# Patient Record
Sex: FEMALE | Employment: FULL TIME | URBAN - METROPOLITAN AREA
[De-identification: names, ages, dates, MRNs, and addresses within clinical notes are randomized per-mention and may not be internally consistent; named-entity substitution may affect disease eponyms.]

---

## 2017-01-03 ENCOUNTER — PRE VISIT (OUTPATIENT)
Dept: OTOLARYNGOLOGY | Facility: CLINIC | Age: 27
End: 2017-01-03

## 2017-01-03 NOTE — TELEPHONE ENCOUNTER
1.  Date/reason for appt: 1/19/17 - tonsillar enlargement  2.  Referring provider: Dr. Vinay Ramesh  3.  Call to patient (Yes / No - short description): no, recs in epic  4.  Previous care at:   - [facility] [place]

## 2017-01-19 ENCOUNTER — OFFICE VISIT (OUTPATIENT)
Dept: OTOLARYNGOLOGY | Facility: CLINIC | Age: 27
End: 2017-01-19

## 2017-01-19 VITALS — WEIGHT: 143 LBS | BODY MASS INDEX: 26.31 KG/M2 | HEIGHT: 62 IN

## 2017-01-19 DIAGNOSIS — R09.82 POST-NASAL DRIP: Primary | ICD-10-CM

## 2017-01-19 ASSESSMENT — PAIN SCALES - GENERAL: PAINLEVEL: NO PAIN (0)

## 2017-01-19 NOTE — PATIENT INSTRUCTIONS
Cleaning of the Nasal or Sinus Cavity    Please follow all three steps. Nasal irrigation (cleaning) should be done 3-4 times/day.    Preparation:  1. Wash your hands  2. We suggest that you buy the [employer] Sinus Rinse Kit  3. Use distilled water or tap water that has been boiled and brought to room temperature.  4. Fill the irrigation bottle with room temperature water (distilled or boiled tap water) and add mixture (pre made packet or homemade recipe).  If you wish to make a homemade recipe:  Mix 1/4 teaspoon salt with 1/4 teaspoon baking soda in each bottle.    Cleansing Irrigation/Sinus Rinse:    2. Fill the irrigation bottle with room temperature water (distilled or boiled tap water) and add packet of salt mixture or the homemade solution.    3. Lean forward over a sink and insert the rinse bottle applicator into the right side of your nose.    4. Tilt head down and to the left side. With your mouth open (breathing through your mouth), gently direct the water around the inside of your nose until clear fluid starts to drain from the opposite nostril. This is called flushing or irrigation.    5. When you have used 1/4 to 1/2 or the solution, switch to the left nostril.    6. To irrigate the left nostril, tilt your head down and to the right side. With your mouth open (breathing through your mouth), gently direct the water around the inside of your nose until clear fluid starts to drain from the opposite nostril.     Cleaning the Equipment:  1. Throw away any leftover solution  2. Clean the rinse bottle and cap with clear water. Air dry.          Call the ENT Clinic if you have any questions or concerns at [phone]                 Do this daily x 7 days.  If continued issues, Dr. Richmond will recommend some steroid medication to help treat your symptoms.  Let us know how you are doing-     Please call/contact with further questions/concerns.     Sincerely,    JASMINA White R.N.    My days of work are  Monday  12:30-4:30 PM, Tuesday 8-4:30, Thursday 8-4:30, primarily in ENT Triage.    [facility]  ENT clinic [phone]   New location:   [address] [place] [address]

## 2017-01-19 NOTE — MR AVS SNAPSHOT
After Visit Summary   1/19/2017    Zeinab Angel    MRN: [id number]           Patient Information     Date Of Birth          1990        Visit Information        Provider Department      1/19/2017 11:00 AM Nathalia Richmond MD [facility] Ear Nose and Throat        Care Instructions       Cleaning of the Nasal or Sinus Cavity    Please follow all three steps. Nasal irrigation (cleaning) should be done 3-4 times/day.    Preparation:  1. Wash your hands  2. We suggest that you buy the NeilMed Sinus Rinse Kit  3. Use distilled water or tap water that has been boiled and brought to room temperature.  4. Fill the irrigation bottle with room temperature water (distilled or boiled tap water) and add mixture (pre made packet or homemade recipe).  If you wish to make a homemade recipe:  Mix 1/4 teaspoon salt with 1/4 teaspoon baking soda in each bottle.    Cleansing Irrigation/Sinus Rinse:    2. Fill the irrigation bottle with room temperature water (distilled or boiled tap water) and add packet of salt mixture or the homemade solution.    3. Lean forward over a sink and insert the rinse bottle applicator into the right side of your nose.    4. Tilt head down and to the left side. With your mouth open (breathing through your mouth), gently direct the water around the inside of your nose until clear fluid starts to drain from the opposite nostril. This is called flushing or irrigation.    5. When you have used 1/4 to 1/2 or the solution, switch to the left nostril.    6. To irrigate the left nostril, tilt your head down and to the right side. With your mouth open (breathing through your mouth), gently direct the water around the inside of your nose until clear fluid starts to drain from the opposite nostril.     Cleaning the Equipment:  1. Throw away any leftover solution  2. Clean the rinse bottle and cap with clear water. Air dry.          Call the ENT Clinic if you have any questions or concerns at  [phone]                 Do this daily x 7 days.  If continued issues, Dr. Richmond will recommend some steroid medication to help treat your symptoms.  Let us know how you are doing-     Please call/contact with further questions/concerns.     Sincerely,    JASMINA White R.N.    My days of work are  Monday 12:30-4:30 PM, Tuesday 8-4:30, Thursday 8-4:30, primarily in ENT Triage.    [facility]  ENT clinic [phone]   New location:   [address] [place] [address]   4th Floor        Follow-ups after your visit        Who to contact     Please call your clinic at [phone] to:    Ask questions about your health    Make or cancel appointments    Discuss your medicines    Learn about your test results    Speak to your doctor   If you have compliments or concerns about an experience at your clinic, or if you wish to file a complaint, please contact [facility] Patient Relations at [phone] or email us at [email or web address]         Additional Information About Your Visit        [email or web address] Information     [employer] gives you secure access to your electronic health record. If you see a primary care provider, you can also send messages to your care team and make appointments. If you have questions, please call your primary care clinic.  If you do not have a primary care provider, please call [phone] and they will assist you.      [employer] is an electronic gateway that provides easy, online access to your medical records. With [employer], you can request a clinic appointment, read your test results, renew a prescription or communicate with your care team.     To access your existing account, please contact your [facility] Clinic or call [phone] for assistance.        Care EveryWhere ID     This is your Care EveryWhere ID. This could be used by other organizations to access your [place] medical records  [id number]        Your Vitals Were     Height BMI (Body Mass  "Index)                1.585 m (5' 2.4\") 25.82 kg/m2           Blood Pressure from Last 3 Encounters:   10/03/16 136/93   08/08/16 136/89   01/29/15 133/84    Weight from Last 3 Encounters:   01/19/17 64.864 kg (143 lb)   10/03/16 63.504 kg (140 lb)   08/08/16 65.273 kg (143 lb 14.4 oz)              Today, you had the following     No orders found for display       Primary Care Provider Office Phone # Fax #    Vinay Ramesh [phone]        [facility] [address] [facility] [address]  [facility] [address]        Thank you!     Thank you for choosing [facility] EAR NOSE AND THROAT  for your care. Our goal is always to provide you with excellent care. Hearing back from our patients is one way we can continue to improve our services. Please take a few minutes to complete the written survey that you may receive in the mail after your visit with us. Thank you!             Your Updated Medication List - Protect others around you: Learn how to safely use, store and throw away your medicines at www.disposemymeds.org.          This list is accurate as of: 1/19/17 11:30 AM.  Always use your most recent med list.                   Brand Name Dispense Instructions for use    etonogestrel 68 MG Impl    IMPLANON    1 each    Implantable device, remove and reinsert in 3 years       triamcinolone 0.1 % ointment    KENALOG    30 g    Apply to areas of eczema 2 times per day until improved. Use as needed. Do not apply to the face, armpits, or groin.         "

## 2017-01-19 NOTE — NURSING NOTE
Chief Complaint   Patient presents with     Consult     New Throat - Tonsillar enlargement      Pt states no pain today.    N Kavin VENTURA

## 2017-01-19 NOTE — PROGRESS NOTES
Otolaryngology Adult Consultation    Patient: Zeinab Angel  : 1990      HPI:  Zeinab Angel is a 26 year old female seen today in the Otolaryngology Clinic for tonsil evaluation.  The patient reports that she has not had an issue with her tonsils up until about August of last year.  She was diagnosed with strep throat although, interestingly, she said she did not really have much pain or fever during that episode.  She just felt her tonsils were very swollen.  She was placed on an antibiotic, but still felt like her tonsils were swollen so she went back and saw her primary care doctor.  She was strep negative, but they did place her on another round of antibiotics.  Since that time, she reports her throat has not completely gone back to normal.  She is not sure if the tonsils are still swollen.  She gets an episodic burning sensation in her throat that goes up to her ears and makes her ears more poppy.  She also feels like there is something stuck in the back of her nose, and it makes it difficult to swallow at times.  She does not have any issues with tonsil stones.           Medications:  Current Outpatient Rx   Name  Route  Sig  Dispense  Refill     etonogestrel (IMPLANON) 68 MG IMPL        Implantable device, remove and reinsert in 3 years    1 each    0       triamcinolone (KENALOG) 0.1 % ointment        Apply to areas of eczema 2 times per day until improved. Use as needed. Do not apply to the face, armpits, or groin.    30 g    2         Allergies: Sulfa drugs     PMH:  Past Medical History   Diagnosis Date     Abnormal pap      Hypertension        PSH:  Past Surgical History   Procedure Laterality Date     No history of surgery         FH:  Family History   Problem Relation Age of Onset     GASTROINTESTINAL DISEASE Mother      diverticulitis     CANCER Maternal Grandfather      leukemia     Neurologic Disorder Maternal Grandmother      parkinsons     Hypertension Maternal Uncle 30     CANCER  Mother 54     renal cancer     CANCER Maternal Grandmother      skin cancer     DIABETES Maternal Grandfather      Hypertension Father         SH:  Social History   Substance Use Topics     Smoking status: Never Smoker      Smokeless tobacco: Never Used     Alcohol Use: Yes      Comment: 3-4 per week       Review of Systems   ENT ROS 1/17/2017   Ears, Nose, Throat Ear pain, Trouble swallowing   Cardiopulmonary Breathing problems       Physical Exam:    GEN:  The patient is alert, oriented and in no acute distress.  HEAD:  Head, face scalp is grossly normal.  EARS:  Ears demonstrate normal canals, auricles and tympanic membranes.  NOSE:  External nose is straight, skin is normal.                Intranasal exam (anterior rhinoscopy) reveals normal moist mucosa, turbinate                  tissue without edema, erythema or crusting.  Septum mainly in the midline.  ORAL:  Oral cavity shows healthy mucosa with out ulceration, masses or other lesions                involving the tongue, palate, buccal mucosa, floor of mouth or gingiva. Tonsils 2+, cryptic but soft, symmetric.  NECK:   Neck is without adenopathy, thyroid or salivary gland masses.  PUL: normal work of breathing; no stridor  CV: RRR; no peripheral edema  M/S: normal muscle tone     DIAGNOSTIC PROCEDURES:  Nasal Endoscopy is performed to provide a more thorough evaluation of the nasal airway than seen on standard nasal speculum exam.  Findings are as follows:   Nasal passages: clear mucous   Nasopharynx: clear, no lesions, no crusting or inflammation    Assessment/Plan:  The patient presents for evaluation of her tonsil.  On my examination today, I do not see any sign of cancer or residual infection.  Her nasal endoscopy did show that she has a lot of clear nasal mucus present, part of which appears to be postnasal drip.  I discussed with the patient that the tonsils are a very sensitive area and that even if there was some edema that we might not be able to  see her, her throat might be able to feel it.  At this time, I would recommend observation.  I do think over time this will get better, and she will adapt to it most likely.  I would recommend having her do salt water irrigations for the postnasal drip once a day for a week.  If this does not improve things and she is still having a lot of throat symptoms, I would recommend placing her on prednisone.  This is something that we can call in for her.  I would put her on prednisone 30 mg p.o. daily for one week and then 20 mg p.o. daily for five days and then 10 mg p.o. daily for two days.  This would make a total of a two week course.  Otherwise, she may follow up with me as needed.         I spent a total of 35 minutes face-to-face with Zeinab Angel during today's office visit.  Over 50% of this time was spent counseling the patient on and/or coordinating care as documented in my assessment and plan.

## 2017-01-19 NOTE — Clinical Note
2017       RE: Zeinab Angel  [address]   [facility] [address]     Dear Colleague,    Thank you for referring your patient, Zeinab Angel, to the [facility] E[facility] TH[facility] at [facility]. Please see a copy of my visit note below.    Otolaryngology Adult Consultation    Patient: Zeinab Angel  : 1990      HPI:  Zeinab Angel is a 26 year old female seen today in the Otolaryngology Clinic for tonsil evaluation.  The patient reports that she has not had an issue with her tonsils up until about August of last year.  She was diagnosed with strep throat although, interestingly, she said she did not really have much pain or fever during that episode.  She just felt her tonsils were very swollen.  She was placed on an antibiotic, but still felt like her tonsils were swollen so she went back and saw her primary care doctor.  She was strep negative, but they did place her on another round of antibiotics.  Since that time, she reports her throat has not completely gone back to normal.  She is not sure if the tonsils are still swollen.  She gets an episodic burning sensation in her throat that goes up to her ears and makes her ears more poppy.  She also feels like there is something stuck in the back of her nose, and it makes it difficult to swallow at times.  She does not have any issues with tonsil stones.           Medications:  Current Outpatient Rx   Name  Route  Sig  Dispense  Refill     etonogestrel (IMPLANON) 68 MG IMPL        Implantable device, remove and reinsert in 3 years    1 each    0       triamcinolone (KENALOG) 0.1 % ointment        Apply to areas of eczema 2 times per day until improved. Use as needed. Do not apply to the face, armpits, or groin.    30 g    2         Allergies: Sulfa drugs     PMH:  Past Medical History   Diagnosis Date     Abnormal pap      Hypertension        PSH:  Past Surgical History   Procedure Laterality Date     No  history of surgery         FH:  Family History   Problem Relation Age of Onset     GASTROINTESTINAL DISEASE Mother      diverticulitis     CANCER Maternal Grandfather      leukemia     Neurologic Disorder Maternal Grandmother      parkinsons     Hypertension Maternal Uncle 30     CANCER Mother 54     renal cancer     CANCER Maternal Grandmother      skin cancer     DIABETES Maternal Grandfather      Hypertension Father         SH:  Social History   Substance Use Topics     Smoking status: Never Smoker      Smokeless tobacco: Never Used     Alcohol Use: Yes      Comment: 3-4 per week       Review of Systems   ENT ROS 1/17/2017   Ears, Nose, Throat Ear pain, Trouble swallowing   Cardiopulmonary Breathing problems       Physical Exam:    GEN:  The patient is alert, oriented and in no acute distress.  HEAD:  Head, face scalp is grossly normal.  EARS:  Ears demonstrate normal canals, auricles and tympanic membranes.  NOSE:  External nose is straight, skin is normal.                Intranasal exam (anterior rhinoscopy) reveals normal moist mucosa, turbinate                  tissue without edema, erythema or crusting.  Septum mainly in the midline.  ORAL:  Oral cavity shows healthy mucosa with out ulceration, masses or other lesions                involving the tongue, palate, buccal mucosa, floor of mouth or gingiva. Tonsils 2+, cryptic but soft, symmetric.  NECK:   Neck is without adenopathy, thyroid or salivary gland masses.  PUL: normal work of breathing; no stridor  CV: RRR; no peripheral edema  M/S: normal muscle tone     DIAGNOSTIC PROCEDURES:  Nasal Endoscopy is performed to provide a more thorough evaluation of the nasal airway than seen on standard nasal speculum exam.  Findings are as follows:   Nasal passages: clear mucous   Nasopharynx: clear, no lesions, no crusting or inflammation    Assessment/Plan:  The patient presents for evaluation of her tonsil.  On my examination today, I do not see any sign of  cancer or residual infection.  Her nasal endoscopy did show that she has a lot of clear nasal mucus present, part of which appears to be postnasal drip.  I discussed with the patient that the tonsils are a very sensitive area and that even if there was some edema that we might not be able to see her, her throat might be able to feel it.  At this time, I would recommend observation.  I do think over time this will get better, and she will adapt to it most likely.  I would recommend having her do salt water irrigations for the postnasal drip once a day for a week.  If this does not improve things and she is still having a lot of throat symptoms, I would recommend placing her on prednisone.  This is something that we can call in for her.  I would put her on prednisone 30 mg p.o. daily for one week and then 20 mg p.o. daily for five days and then 10 mg p.o. daily for two days.  This would make a total of a two week course.  Otherwise, she may follow up with me as needed.         I spent a total of 35 minutes face-to-face with Zeinab Angel during today's office visit.  Over 50% of this time was spent counseling the patient on and/or coordinating care as documented in my assessment and plan.        Again, thank you for allowing me to participate in the care of your patient.      Sincerely,    Nathalia Richmond MD

## 2017-05-25 ENCOUNTER — OFFICE VISIT (OUTPATIENT)
Dept: DERMATOLOGY | Facility: CLINIC | Age: 27
End: 2017-05-25

## 2017-05-25 DIAGNOSIS — Z12.83 SKIN CANCER SCREENING: ICD-10-CM

## 2017-05-25 DIAGNOSIS — L30.9 DERMATITIS: Primary | ICD-10-CM

## 2017-05-25 DIAGNOSIS — D22.9 MULTIPLE BENIGN NEVI: ICD-10-CM

## 2017-05-25 RX ORDER — HYDROCORTISONE 25 MG/G
OINTMENT TOPICAL 2 TIMES DAILY
Qty: 30 G | Refills: 3 | Status: SHIPPED | OUTPATIENT
Start: 2017-05-25 | End: 2020-12-07

## 2017-05-25 ASSESSMENT — PAIN SCALES - GENERAL: PAINLEVEL: NO PAIN (0)

## 2017-05-25 NOTE — PROGRESS NOTES
I talked with and examined Zeinab Angel and I agree with the assessment and the plan. YASMIN Reed MD.

## 2017-05-25 NOTE — NURSING NOTE
Chief Complaint   Patient presents with     Skin Check     Zeinab is here today for a skin check and has a spot she would like checked out above her right ear. Also would like some dry patches on her eyebrows checked out     Lico Hollingsworth LPN

## 2017-05-25 NOTE — PROGRESS NOTES
[facility] Dermatology Note      Dermatology Problem List:  1.Eyelid dermatitis. Irritant versus allergic contact.   - hydrocortisone 2.5% ointment BID  - consider protopic if not improving; referral for patch testing if recalcitrant to therapy  2. Multiple benign nevi, inclduing benign nevus on back s/p removal >10 years ago with re-pigmentation.   3. Atopic dermatitis.   - triamcinolone 0.1% ointment PRN  4. Tinea versicolor, s/p ketoconazole shampoo and cream.     Encounter Date: May 25, 2017    CC:   Chief Complaint   Patient presents with     Skin Check       History of Present Illness:  Ms. Zeinab Angel is a 26 year old female who presents as a follow-up for a full body skin examination. The patient was last seen 11/6/2014 for a skin check. She was treated for suspected tinea versicolor on the chest with ketoconazole 2% shampoo and cream. Also was noted to have repigmentation of a spot on her back.     Today, she reports no new or concerning lesions. No bleeding, pruritic or painful lesions. She does have a history of possible eczema/allergic contact dermatitis of her armpits that has largely resolved after switching to a natural deodarant. She unfortunately does reports a 3 month history of a new itchy rash around her bilateral eyelids. She denies any new contact exposures. Does use eyeliner and nail polish occasionally, but no recent changes in the brands she uses. She has been applying cetaphil lotion to this area once or twice daily with some relief. She otherwise reports no personal or family history of skin cancer. Prior history of indoor tanning (>20 sessions) in her teenage years, but none recently. She otherwise is well and does not take any medications regularly. She has no other skin concerns.     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 The patient admits to prior use of tanning beds.    Family History:  There is no family history of skin cancer.    Medications:  Current Outpatient Prescriptions   Medication Sig Dispense Refill     triamcinolone (KENALOG) 0.1 % ointment Apply to areas of eczema 2 times per day until improved. Use as needed. Do not apply to the face, armpits, or groin. 30 g 2     etonogestrel (IMPLANON) 68 MG IMPL Implantable device, remove and reinsert in 3 years 1 each 0        Allergies   Allergen Reactions     Sulfa Drugs Hives         Review of Systems:  -Skin Establ Pt: The patient denies any new rash, pruritus, or lesions that are symptomatic, changing or bleeding, except as per HPI.  -Constitutional: The patient denies fatigue, fevers, chills, unintended weight loss, and night sweats.  -HEENT: Patient denies nonhealing oral sores.  -Skin: As above in HPI. No additional skin concerns.    Physical exam:  Vitals: There were no vitals taken for this visit.  GEN: This is a well developed, well-nourished female in no acute distress, in a pleasant mood.    SKIN: Total skin excluding the undergarment areas was performed. The exam included the head/face, neck, both arms, chest, back, abdomen, both legs, digits and/or nails.   - Kay Type I/II  - Multiple (20-30 total) regular brown pigmented macules and papules are identified on the trunk, scalp and extremities.   - On the right upper back, there is a ~ 8 mm hypopigmented scar at prior shave biopsy site. Central areas of re-pigmentation appreciated. Repigmentation confined to the biopsy scar.   - Bilateral upper eyelids with few discrete pink eczematous plaques with overlying scale.   - No other lesions of  concern on areas examined.     Impression/Plan:    1. Eyelid dermatitis. Suspect irritant (more likely) versus allergic contact dermatitis. Will start hydrocortisone 2.5% ointment BID x 2 weeks and then decreasing frequency of use as tolerated. Can consider transition to protopic 0.1% ointment if needed. Also recommended moisturization with Ceptahil, Aquaphor, or Vaseline BID especially when transitioning off topical steroids. She can follow-up in 8 weeks to assess response to treatment. If not improving, could consider referral for patch testing.       2. Multiple clinically benign nevi on the trunk and extremities.     ABCDs of melanoma were discussed and self skin checks were advised.     Sun precaution was advised including the use of sun screens of SPF 30 or higher, sun protective clothing, and avoidance of tanning beds.    Given history of indoor tanning, recommended TBSE every 2 years.       Follow-up in 8 weeks, earlier for new or changing lesions.   Dr. Reed staffed the patient.    Staff Involved:  Resident(Julio César Dockery)/Staff(as above)

## 2017-05-25 NOTE — LETTER
5/25/2017     RE: Zeinab Angel  [address]     [facility] [address]     Dear Colleague,    Thank you for referring your patient, Zeinab Angel, to the [facility] DERMATOLOGY at [facility]. Please see a copy of my visit note below.    [facility] Dermatology Note      Dermatology Problem List:  1.Eyelid dermatitis. Irritant versus allergic contact.   - hydrocortisone 2.5% ointment BID  - consider protopic if not improving; referral for patch testing if recalcitrant to therapy  2. Multiple benign nevi, inclduing benign nevus on back s/p removal >10 years ago with re-pigmentation.   3. Atopic dermatitis.   - triamcinolone 0.1% ointment PRN  4. Tinea versicolor, s/p ketoconazole shampoo and cream.     Encounter Date: May 25, 2017    CC:   Chief Complaint   Patient presents with     Skin Check       History of Present Illness:  Ms. Zeinab Angel is a 26 year old female who presents as a follow-up for a full body skin examination. The patient was last seen 11/6/2014 for a skin check. She was treated for suspected tinea versicolor on the chest with ketoconazole 2% shampoo and cream. Also was noted to have repigmentation of a spot on her back.     Today, she reports no new or concerning lesions. No bleeding, pruritic or painful lesions. She does have a history of possible eczema/allergic contact dermatitis of her armpits that has largely resolved after switching to a natural deodarant. She unfortunately does reports a 3 month history of a new itchy rash around her bilateral eyelids. She denies any new contact exposures. Does use eyeliner and nail polish occasionally, but no recent changes in the brands she uses. She has been applying cetaphil lotion to this area once or twice daily with some relief. She otherwise reports no personal or family history of skin cancer. Prior history of indoor tanning (>20 sessions) in her teenage years, but none  recently. She otherwise is well and does not take any medications regularly. She has no other skin concerns.     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 The patient admits to prior use of tanning beds.    Family History:  There is no family history of skin cancer.    Medications:  Current Outpatient Prescriptions   Medication Sig Dispense Refill     triamcinolone (KENALOG) 0.1 % ointment Apply to areas of eczema 2 times per day until improved. Use as needed. Do not apply to the face, armpits, or groin. 30 g 2     etonogestrel (IMPLANON) 68 MG IMPL Implantable device, remove and reinsert in 3 years 1 each 0        Allergies   Allergen Reactions     Sulfa Drugs Hives         Review of Systems:  -Skin Establ Pt: The patient denies any new rash, pruritus, or lesions that are symptomatic, changing or bleeding, except as per HPI.  -Constitutional: The patient denies fatigue, fevers, chills, unintended weight loss, and night sweats.  -HEENT: Patient denies nonhealing oral sores.  -Skin: As above in HPI. No additional skin concerns.    Physical exam:  Vitals: There were no vitals taken for this visit.  GEN: This is a well developed, well-nourished female in no acute distress, in a pleasant mood.    SKIN: Total skin excluding the undergarment areas was performed. The exam included the head/face, neck, both arms, chest, back, abdomen, both legs, digits and/or nails.   - Kay Type I/II  - Multiple (20-30 total) regular brown pigmented macules and papules are identified on the trunk, scalp and extremities.   - On the  right upper back, there is a ~ 8 mm hypopigmented scar at prior shave biopsy site. Central areas of re-pigmentation appreciated. Repigmentation confined to the biopsy scar.   - Bilateral upper eyelids with few discrete pink eczematous plaques with overlying scale.   - No other lesions of concern on areas examined.     Impression/Plan:    1. Eyelid dermatitis. Suspect irritant (more likely) versus allergic contact dermatitis. Will start hydrocortisone 2.5% ointment BID x 2 weeks and then decreasing frequency of use as tolerated. Can consider transition to protopic 0.1% ointment if needed. Also recommended moisturization with Ceptahil, Aquaphor, or Vaseline BID especially when transitioning off topical steroids. She can follow-up in 8 weeks to assess response to treatment. If not improving, could consider referral for patch testing.       2. Multiple clinically benign nevi on the trunk and extremities.     ABCDs of melanoma were discussed and self skin checks were advised.     Sun precaution was advised including the use of sun screens of SPF 30 or higher, sun protective clothing, and avoidance of tanning beds.    Given history of indoor tanning, recommended TBSE every 2 years.       Follow-up in 8 weeks, earlier for new or changing lesions.   Dr. Rede staffed the patient.    Staff Involved:  Resident(Julio César Dockery)/Staff(as above)    I talked with and examined Zeinab Angel and I agree with the assessment and the plan. YASMIN Reed MD.    Again, thank you for allowing me to participate in the care of your patient.      Sincerely,    Julio César Dockery MD

## 2017-05-25 NOTE — MR AVS SNAPSHOT
After Visit Summary   5/25/2017    Zeinab Angel    MRN: [id number]           Patient Information     Date Of Birth          1990        Visit Information        Provider Department      5/25/2017 8:30 AM Julio César Dockery MD [facility]        Today's Diagnoses     Dermatitis    -  1    Skin cancer screening        Multiple benign nevi           Follow-ups after your visit        Your next 10 appointments already scheduled     Jul 27, 2017  8:15 AM CDT   (Arrive by 8:00 AM)   Return Visit with Julio César Dockery MD   [facility] ([facility] [place])    [address] [place] [id number] [phone]              Who to contact     Please call your clinic at [phone] to:    Ask questions about your health    Make or cancel appointments    Discuss your medicines    Learn about your test results    Speak to your doctor   If you have compliments or concerns about an experience at your clinic, or if you wish to file a complaint, please contact [facility] Patient Relations at [phone] or email us at [email or web address]         Additional Information About Your Visit        MyChart Information     [employer] gives you secure access to your electronic health record. If you see a primary care provider, you can also send messages to your care team and make appointments. If you have questions, please call your primary care clinic.  If you do not have a primary care provider, please call [phone] and they will assist you.      [employer] is an electronic gateway that provides easy, online access to your medical records. With [employer], you can request a clinic appointment, read your test results, renew a prescription or communicate with your care team.     To access your existing account, please contact your [facility] or call [phone] for assistance.        Care  EveryWhere ID     This is your Care EveryWhere ID. This could be used by other organizations to access your [place] medical records  [id number]         Blood Pressure from Last 3 Encounters:   10/03/16 (!) 136/93   08/08/16 136/89   01/29/15 133/84    Weight from Last 3 Encounters:   01/19/17 64.9 kg (143 lb)   10/03/16 63.5 kg (140 lb)   08/08/16 65.3 kg (143 lb 14.4 oz)              Today, you had the following     No orders found for display         Today's Medication Changes          These changes are accurate as of: 5/25/17  8:55 AM.  If you have any questions, ask your nurse or doctor.               Start taking these medicines.        Dose/Directions    hydrocortisone 2.5 % ointment   Used for:  Dermatitis   Started by:  Julio César Dockery MD        Apply topically 2 times daily   Quantity:  30 g   Refills:  3            Where to get your medicines      These medications were sent to [id number] - [place], [place] - [address] AT [facility] [address]  [address], [place] [address]     Phone:  [phone]     hydrocortisone 2.5 % ointment                Primary Care Provider Office Phone # Fax #    Vinay Ramesh -[phone]        [facility] [address] [place] [address] [address]  [facility] [address]        Thank you!     Thank you for choosing [facility] DERMATOLOGY  for your care. Our goal is always to provide you with excellent care. Hearing back from our patients is one way we can continue to improve our services. Please take a few minutes to complete the written survey that you may receive in the mail after your visit with us. Thank you!             Your Updated Medication List - Protect others around you: Learn how to safely use, store and throw away your medicines at www.disposemymeds.org.          This list is accurate as of: 5/25/17  8:55 AM.  Always use your most recent med list.                   Brand Name Dispense Instructions for  use    etonogestrel 68 MG Impl    IMPLANON    1 each    Implantable device, remove and reinsert in 3 years       hydrocortisone 2.5 % ointment     30 g    Apply topically 2 times daily       triamcinolone 0.1 % ointment    KENALOG    30 g    Apply to areas of eczema 2 times per day until improved. Use as needed. Do not apply to the face, armpits, or groin.

## 2017-07-27 ENCOUNTER — OFFICE VISIT (OUTPATIENT)
Dept: DERMATOLOGY | Facility: CLINIC | Age: 27
End: 2017-07-27

## 2017-07-27 DIAGNOSIS — B36.0 TV (TINEA VERSICOLOR): Primary | ICD-10-CM

## 2017-07-27 DIAGNOSIS — H01.9 DERMATITIS OF EYELIDS OF BOTH EYES: ICD-10-CM

## 2017-07-27 RX ORDER — KETOCONAZOLE 20 MG/G
CREAM TOPICAL
Qty: 30 G | Refills: 2 | Status: SHIPPED | OUTPATIENT
Start: 2017-07-27 | End: 2020-12-07

## 2017-07-27 RX ORDER — TACROLIMUS 1 MG/G
OINTMENT TOPICAL
Qty: 60 G | Refills: 0 | Status: SHIPPED | OUTPATIENT
Start: 2017-07-27 | End: 2020-12-07

## 2017-07-27 ASSESSMENT — PAIN SCALES - GENERAL: PAINLEVEL: NO PAIN (0)

## 2017-07-27 NOTE — PATIENT INSTRUCTIONS
1. Start using hydrocortisone 2.5% ointment for 2 weeks, then switch to protopic 0.1% ointment twice daily, then plain vaseline.   2. Start using ketoconazole 2% cream for the body.   3. Follow-up in 2-3 months.

## 2017-07-27 NOTE — MR AVS SNAPSHOT
After Visit Summary   7/27/2017    Zeinab Angel    MRN: [id number]           Patient Information     Date Of Birth          1990        Visit Information        Provider Department      7/27/2017 8:15 AM Julio César Dockery MD [facility]        Today's Diagnoses     TV (tinea versicolor)    -  1    Dermatitis of eyelids of both eyes          Care Instructions    1. Start using hydrocortisone 2.5% ointment for 2 weeks, then switch to protopic 0.1% ointment twice daily, then plain vaseline.   2. Start using ketoconazole 2% cream for the body.   3. Follow-up in 2-3 months.           Follow-ups after your visit        Your next 10 appointments already scheduled     Sep 28, 2017  9:15 AM CDT   (Arrive by 9:00 AM)   Return Visit with Julio César Dockery MD   [facility] ([facility] [place])    [address] [place] [id number] [phone]              Who to contact     Please call your clinic at [phone] to:    Ask questions about your health    Make or cancel appointments    Discuss your medicines    Learn about your test results    Speak to your doctor   If you have compliments or concerns about an experience at your clinic, or if you wish to file a complaint, please contact [facility] Patient Relations at [phone] or email us at [email or web address]         Additional Information About Your Visit        MyChart Information     [employer] gives you secure access to your electronic health record. If you see a primary care provider, you can also send messages to your care team and make appointments. If you have questions, please call your primary care clinic.  If you do not have a primary care provider, please call [phone] and they will assist you.      [employer] is an electronic gateway that provides easy, online access to your medical records. With [employer], you can request a clinic  appointment, read your test results, renew a prescription or communicate with your care team.     To access your existing account, please contact your [facility] or call [phone] for assistance.        Care EveryWhere ID     This is your Care EveryWhere ID. This could be used by other organizations to access your [place] medical records  [id number]         Blood Pressure from Last 3 Encounters:   10/03/16 (!) 136/93   08/08/16 136/89   01/29/15 133/84    Weight from Last 3 Encounters:   01/19/17 64.9 kg (143 lb)   10/03/16 63.5 kg (140 lb)   08/08/16 65.3 kg (143 lb 14.4 oz)              Today, you had the following     No orders found for display         Today's Medication Changes          These changes are accurate as of: 7/27/17  8:47 AM.  If you have any questions, ask your nurse or doctor.               Start taking these medicines.        Dose/Directions    ketoconazole 2 % cream   Commonly known as:  NIZORAL   Used for:  TV (tinea versicolor)   Started by:  Julio César Dockery MD        Apply twice daily to rash on body for 2-3 weeks or until resolved.   Quantity:  30 g   Refills:  2       tacrolimus 0.1 % ointment   Commonly known as:  PROTOPIC   Used for:  Dermatitis of eyelids of both eyes   Started by:  Julio César Dockery MD        Apply twice daily to rash on eyelids   Quantity:  60 g   Refills:  0            Where to get your medicines      These medications were sent to [id number] - [place], [place] - [address]  AT [facility] [address]  [facility] [address], [place] [address]     Phone:  [phone]     ketoconazole 2 % cream    tacrolimus 0.1 % ointment                Primary Care Provider Office Phone # Fax #    Vinay Ramesh -[phone]       [address] [address]        Equal Access to Services     ASHLIE MCNAMARA AH: paige oLaiza  merarisharon anthony puckett. [facility] [phone].    ATENCIÓN: Si saima cárdenas, tiene a felix disposición servicios gratuitos de asistencia lingüística. Rosalia al [phone].    We comply with applicable federal civil rights laws and [place] laws. We do not discriminate on the basis of race, color, national origin, age, disability sex, sexual orientation or gender identity.            Thank you!     Thank you for choosing [facility] DERMATOLOGY  for your care. Our goal is always to provide you with excellent care. Hearing back from our patients is one way we can continue to improve our services. Please take a few minutes to complete the written survey that you may receive in the mail after your visit with us. Thank you!             Your Updated Medication List - Protect others around you: Learn how to safely use, store and throw away your medicines at www.disposemymeds.org.          This list is accurate as of: 7/27/17  8:47 AM.  Always use your most recent med list.                   Brand Name Dispense Instructions for use Diagnosis    etonogestrel 68 MG Impl    IMPLANON    1 each    Implantable device, remove and reinsert in 3 years    Contraception, Nexplanon insertion       hydrocortisone 2.5 % ointment     30 g    Apply topically 2 times daily    Dermatitis       ketoconazole 2 % cream    NIZORAL    30 g    Apply twice daily to rash on body for 2-3 weeks or until resolved.    TV (tinea versicolor)       tacrolimus 0.1 % ointment    PROTOPIC    60 g    Apply twice daily to rash on eyelids    Dermatitis of eyelids of both eyes       triamcinolone 0.1 % ointment    KENALOG    30 g    Apply to areas of eczema 2 times per day until improved. Use as needed. Do not apply to the face, armpits, or groin.    CD (contact dermatitis)

## 2017-07-27 NOTE — NURSING NOTE
Dermatology Rooming Note    Zeinab Angel's goals for this visit include:   Chief Complaint   Patient presents with     Derm Problem     Zeinab is here today for a 8 week follow up on a rash she had on her forehead     Pearl Snowden MA

## 2017-07-27 NOTE — PROGRESS NOTES
[facility] Dermatology Note      Dermatology Problem List:  1.Eyelid dermatitis. Irritant versus allergic contact.   - hydrocortisone 2.5% ointment BID then transition to protopic 0.1% ointment BID  - patch test referral  2. Multiple benign nevi, inclduing benign nevus on back s/p removal >10 years ago with re-pigmentation.   3. Atopic dermatitis.   - triamcinolone 0.1% ointment PRN  4. Tinea versicolor.  - ketoconazole 2% cream     Encounter Date: Jul 27, 2017    CC:   Chief Complaint   Patient presents with     Derm Problem     Zeinab is here today for a 8 week follow up on a rash she had on her forehead       History of Present Illness:  Ms. Zeinab Angel is a 26 year old female who presents as a follow-up for an eyelid dermatitis. Last seen about 8 weeks ago for a FBSE and started on hydrocortisone 2.5% ointment for an eyelid dermatitis. She states she used the hydrocortisone 2.5% ointment twice daily for about 2-weeks with near complete resolution of her rash. However, when she discontinued use she noticed a significant, although be it milder recurrence that has been progressive since. Described as mildly itch, pink scaly spots on her upper eyelids only. She reports no new topical exposures, though has been using nail polish more recently for her nails. Does use mascara and eyeliner and other cosmetics around the eyelid. No personal or family history of eczema. She works a desk job. Of note, patient also reports a 3-4 week history of a pink red rash on her back and chest. States this was noticed by friends when outside in a bathing suit a few weeks ago. Rash is not itchy or painful. Health otherwise stable. No recent illnesses, fevers, chills, night sweats. No other skin concerns.       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 The patient admits to prior use of tanning beds.    Family History:  There is no family history of skin cancer.    Medications:  Current Outpatient Prescriptions   Medication Sig Dispense Refill     hydrocortisone 2.5 % ointment Apply topically 2 times daily 30 g 3     triamcinolone (KENALOG) 0.1 % ointment Apply to areas of eczema 2 times per day until improved. Use as needed. Do not apply to the face, armpits, or groin. 30 g 2     etonogestrel (IMPLANON) 68 MG IMPL Implantable device, remove and reinsert in 3 years 1 each 0        Allergies   Allergen Reactions     Sulfa Drugs Hives         Review of Systems:  -Skin Establ Pt: The patient denies any new rash, pruritus, or lesions that are symptomatic, changing or bleeding, except as per HPI.  -Constitutional: The patient denies fatigue, fevers, chills, unintended weight loss, and night sweats.  -HEENT: Patient denies nonhealing oral sores.  -Skin: As above in HPI. No additional skin concerns.    Physical exam:  Vitals: There were no vitals taken for this visit.  GEN: This is a well developed, well-nourished female in no acute distress, in a pleasant mood.    SKIN: Focused exam of the face, scalp, neck, back, chest, abdomen significant for:  - Kay Type I/II  - On the L upper chest, medial breasts, and lower back, are multiple pink annular appearing plaques with overlying scale. KOH performed positive with clusters of yeast and long hyphae with \"spaghetti and meatballs\" like appearance.   - Bilateral upper eyelids with few discrete pink eczematous plaques with overlying scale.   - No other lesions of concern on areas examined.     Impression/Plan:    1. Eyelid dermatitis. Suspect irritant (more likely) versus allergic " contact dermatitis. Recommended restarting hydrocortisone 2.5% ointment BID x 2 weeks, then transition to protopic 0.1% ointment BID with slow tapering over 2-3 weeks, then vaseline BID. Gentle skin care advised, and asked patient to avoid any cosmetics to the face/eyes and to avoid nail polish and/or other external applications to the nails for the next 2-3- months. Given persistence and patient concern over a possibility contact allergen, recommended referral to [facility] for patch testing. Can follow-up in 2-3 months.     2. Tinea versicolor. KOH positive. Start ketoconazole 2% cream BID until resolved.     Follow-up in 2-3 months, earlier for new or changing lesions.   Dr. Falk staffed the patient.    Staff Involved:  Resident(Julio César Dockery)/Staff(as above)    I have personally examined this patient and agree with Dr. Dockery's documentation and plan of care. I have reviewed and amended the resident's note above. The documentation accurately reflects my clinical observations, diagnoses, treatment and follow-up plans.     Susie Falk MD  Dermatology Staff

## 2017-07-27 NOTE — LETTER
7/27/2017       RE: Zeinab Angel  [address]   [place] [facility] [place] [facility] [address]     Dear Colleague,    Thank you for referring your patient, Zeinab Angel, to the [facility] DERMATOLOGY at [facility]. Please see a copy of my visit note below.    [facility] Dermatology Note      Dermatology Problem List:  1.Eyelid dermatitis. Irritant versus allergic contact.   - hydrocortisone 2.5% ointment BID then transition to protopic 0.1% ointment BID  - patch test referral  2. Multiple benign nevi, inclduing benign nevus on back s/p removal >10 years ago with re-pigmentation.   3. Atopic dermatitis.   - triamcinolone 0.1% ointment PRN  4. Tinea versicolor.  - ketoconazole 2% cream     Encounter Date: Jul 27, 2017    CC:   Chief Complaint   Patient presents with     Derm Problem     Zeinab is here today for a 8 week follow up on a rash she had on her forehead       History of Present Illness:  Ms. Zeinab Angel is a 26 year old female who presents as a follow-up for an eyelid dermatitis. Last seen about 8 weeks ago for a FBSE and started on hydrocortisone 2.5% ointment for an eyelid dermatitis. She states she used the hydrocortisone 2.5% ointment twice daily for about 2-weeks with near complete resolution of her rash. However, when she discontinued use she noticed a significant, although be it milder recurrence that has been progressive since. Described as mildly itch, pink scaly spots on her upper eyelids only. She reports no new topical exposures, though has been using nail polish more recently for her nails. Does use mascara and eyeliner and other cosmetics around the eyelid. No personal or family history of eczema. She works a desk job. Of note, patient also reports a 3-4 week history of a pink red rash on her back and chest. States this was noticed by friends when outside in a bathing suit a few weeks ago. Rash is not itchy or painful. Health otherwise stable. No  recent illnesses, fevers, chills, night sweats. No other skin concerns.       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 The patient admits to prior use of tanning beds.    Family History:  There is no family history of skin cancer.    Medications:  Current Outpatient Prescriptions   Medication Sig Dispense Refill     hydrocortisone 2.5 % ointment Apply topically 2 times daily 30 g 3     triamcinolone (KENALOG) 0.1 % ointment Apply to areas of eczema 2 times per day until improved. Use as needed. Do not apply to the face, armpits, or groin. 30 g 2     etonogestrel (IMPLANON) 68 MG IMPL Implantable device, remove and reinsert in 3 years 1 each 0        Allergies   Allergen Reactions     Sulfa Drugs Hives         Review of Systems:  -Skin Establ Pt: The patient denies any new rash, pruritus, or lesions that are symptomatic, changing or bleeding, except as per HPI.  -Constitutional: The patient denies fatigue, fevers, chills, unintended weight loss, and night sweats.  -HEENT: Patient denies nonhealing oral sores.  -Skin: As above in HPI. No additional skin concerns.    Physical exam:  Vitals: There were no vitals taken for this visit.  GEN: This is a well developed, well-nourished female in no acute distress, in a pleasant mood.    SKIN: Focused exam of the face, scalp, neck, back, chest, abdomen significant for:  - Kay Type I/II  - On the L upper chest, medial breasts, and lower back, are multiple pink annular appearing plaques with overlying scale. KOH performed positive with clusters of yeast and long  "hyphae with \"spaghetti and meatballs\" like appearance.   - Bilateral upper eyelids with few discrete pink eczematous plaques with overlying scale.   - No other lesions of concern on areas examined.     Impression/Plan:    1. Eyelid dermatitis. Suspect irritant (more likely) versus allergic contact dermatitis. Recommended restarting hydrocortisone 2.5% ointment BID x 2 weeks, then transition to protopic 0.1% ointment BID with slow tapering over 2-3 weeks, then vaseline BID. Gentle skin care advised, and asked patient to avoid any cosmetics to the face/eyes and to avoid nail polish and/or other external applications to the nails for the next 2-3- months. Given persistence and patient concern over a possibility contact allergen, recommended [place] [facility] for patch testing. Can follow-up in 2-3 months.     2. Tinea versicolor. KOH positive. Start ketoconazole 2% cream BID until resolved.     Follow-up in 2-3 months, earlier for new or changing lesions.   Dr. Falk staffed the patient.    Staff Involved:  Resident(Julio César Dockery)/Staff(as above)    I have personally examined this patient and agree with Dr. Dockery's documentation and plan of care. I have reviewed and amended the resident's note above. The documentation accurately reflects my clinical observations, diagnoses, treatment and follow-up plans.     Susie Falk MD  Dermatology Staff        "

## 2017-08-04 ENCOUNTER — DOCUMENTATION ONLY (OUTPATIENT)
Dept: DERMATOLOGY | Facility: CLINIC | Age: 27
End: 2017-08-04

## 2017-08-04 ENCOUNTER — TELEPHONE (OUTPATIENT)
Dept: DERMATOLOGY | Facility: CLINIC | Age: 27
End: 2017-08-04

## 2017-10-02 ENCOUNTER — MYC MEDICAL ADVICE (OUTPATIENT)
Dept: FAMILY MEDICINE | Facility: CLINIC | Age: 27
End: 2017-10-02

## 2017-10-02 NOTE — TELEPHONE ENCOUNTER
Dr. Ramesh,   Please let me know what is needed in preparation for this appointment. Patient had Nexplanon placed by OB/GYN on 12/18/14. Last Pap in EPIC noted to be 12/2/13. Per office note from 12/18/14, patient due for Pap in Dec, 2016, and Nexplanon should be removed or replaced by 12/18/17.   Elidia Villa RN, Care Coordinator

## 2017-10-04 ASSESSMENT — ENCOUNTER SYMPTOMS
EXERCISE INTOLERANCE: 1
TACHYCARDIA: 1

## 2017-10-09 ENCOUNTER — OFFICE VISIT (OUTPATIENT)
Dept: FAMILY MEDICINE | Facility: CLINIC | Age: 27
End: 2017-10-09

## 2017-10-09 VITALS
DIASTOLIC BLOOD PRESSURE: 91 MMHG | OXYGEN SATURATION: 98 % | WEIGHT: 146 LBS | HEART RATE: 78 BPM | RESPIRATION RATE: 16 BRPM | SYSTOLIC BLOOD PRESSURE: 135 MMHG | BODY MASS INDEX: 26.36 KG/M2

## 2017-10-09 DIAGNOSIS — Z30.46 ENCOUNTER FOR SURVEILLANCE OF NEXPLANON SUBDERMAL CONTRACEPTIVE: ICD-10-CM

## 2017-10-09 DIAGNOSIS — B07.0 PLANTAR WARTS: ICD-10-CM

## 2017-10-09 DIAGNOSIS — Z00.00 ROUTINE GENERAL MEDICAL EXAMINATION AT A HEALTH CARE FACILITY: Primary | ICD-10-CM

## 2017-10-09 ASSESSMENT — PAIN SCALES - GENERAL: PAINLEVEL: NO PAIN (0)

## 2017-10-09 NOTE — PATIENT INSTRUCTIONS
Primary Care Center Phone Number [phone]  Primary Care Center Medication Refill Request Information:  * Please contact your pharmacy regarding ANY request for medication refills.  ** [facility] Prescription Fax = [phone]  * Please allow 3 business days for routine medication refills.  * Please allow 5 business days for controlled substance medication refills.     Primary Care Center Test Result notification information:  *You will be notified with in 7-10 days of your appointment day regarding the results of your test.  If you are on MyChart you will be notified as soon as the provider has reviewed the results and signed off on them.

## 2017-10-09 NOTE — NURSING NOTE
Chief Complaint   Patient presents with     Physical     pt is here for an annual physical       Lexy Lord CMA at 4:11 PM on 10/9/2017

## 2017-10-09 NOTE — MR AVS SNAPSHOT
After Visit Summary   10/9/2017    Zeinab Angel    MRN: [id number]           Patient Information     Date Of Birth          1990        Visit Information        Provider Department      10/9/2017 4:20 PM Vinay Ramesh MD [facility]        Today's Diagnoses     Routine general medical examination at a health care facility    -  1    Encounter for surveillance of Nexplanon subdermal contraceptive        Plantar warts          Care Instructions    Primary Care Center Phone Number [phone]  Primary Care Center Medication Refill Request Information:  * Please contact your pharmacy regarding ANY request for medication refills.  ** [facility] Prescription Fax = [phone]  * Please allow 3 business days for routine medication refills.  * Please allow 5 business days for controlled substance medication refills.     Primary Care Center Test Result notification information:  *You will be notified with in 7-10 days of your appointment day regarding the results of your test.  If you are on MyChart you will be notified as soon as the provider has reviewed the results and signed off on them.              Follow-ups after your visit        Follow-up notes from your care team     Return in about 8 weeks (around 12/4/2017) for nexplanon.      Your next 10 appointments already scheduled     Dec 04, 2017  9:30 AM CST   LAB with  LAB   [facility] ([facility])    [address] [place] [address] [phone]           Patient must bring picture ID. Patient should be prepared to give a urine specimen  Please do not eat 10-12 hours before your appointment if you are coming in fasting for labs on lipids, cholesterol, or glucose (sugar). Pregnant women should follow their Care Team instructions. Water with medications is okay. Do not drink coffee or other fluids. If you have concerns about taking  your medications, please ask at office or if  scheduling via [facility], send a message by clicking on Secure Messaging, Message Your Care Team.            Dec 04, 2017 10:00 AM CST   (Arrive by 9:45 AM)   Return Visit with Vinay Ramesh MD   [facility] ([facility])    [address]  4th [address]  [place] [facility] [address] [phone]            Dec 28, 2017  8:00 AM CST   (Arrive by 7:45 AM)   Return Visit with Julio César Dockery MD   [facility] Dermat[facility] ([facility])    [address]  3rd Floor  [place] [facility] [address] [phone]              Future tests that were ordered for you today     Open Future Orders        Priority Expected Expires Ordered    HCG qualitative urine Routine 12/4/2017 10/9/2018 10/9/2017            Who to contact     Please call your clinic at [phone] to:    Ask questions about your health    Make or cancel appointments    Discuss your medicines    Learn about your test results    Speak to your doctor   If you have compliments or concerns about an experience at your clinic, or if you wish to file a complaint, please contact [facility] Patient Relations at [phone] or email us at [email or web address]         Additional Information About Your Visit        [facility] [employer] [facility] Information     [facility] gives you secure access to your electronic health record. If you see a primary care provider, you can also send messages to your care team and make appointments. If you have questions, please call your primary care clinic.  If you do not have a primary care provider, please call [phone] and they will assist you.      [facility] is an electronic gateway that provides easy, online access to your medical records. With [facility], you can request a clinic appointment, read your test results, renew a prescription or communicate with your care team.     To access your existing account, please contact your [facility] [facility] or call [phone] for assistance.        Care EveryWhere ID     This is your Care EveryWhere ID. This could be used by other organizations to access your [facility] medical records  [id number]        Your Vitals Were     Pulse Respirations Pulse Oximetry Breastfeeding? BMI (Body Mass Index)       78 16 98% No 26.36 kg/m2        Blood Pressure from Last 3 Encounters:   10/09/17 (!) 135/91   10/03/16 (!) 136/93   08/08/16 136/89    Weight from Last 3 Encounters:   10/09/17 66.2 kg (146 lb)   01/19/17 64.9 kg (143 lb)   10/03/16 63.5 kg (140 lb)              We Performed the Following     DESTRUCT BENIGN LESION, UP TO 14     Pap imaged thin layer screen reflex to HPV if ASCUS - recommend age 25 - 29          Today's Medication Changes          These changes are accurate as of: 10/9/17  5:05 PM.  If you have any questions, ask your nurse or doctor.               These medicines have changed or have updated prescriptions.        Dose/Directions    * etonogestrel 68 MG Impl   Commonly known as:  IMPLANON   This may have changed:  Another medication with the same name was added. Make sure you understand how and when to take each.   Used for:  Contraception, Nexplanon insertion   Changed by:  Yamila Cid MD        Implantable device, remove and reinsert in 3 years   Quantity:  1 each   Refills:  0       * etonogestrel 68 MG Impl   Commonly known as:  IMPLANON/NEXPLANON   This may have changed:  You were already taking a medication with the same name, and this prescription was added. Make sure you understand how and when to take each.   Used for:  Encounter for surveillance of Nexplanon subdermal contraceptive   Changed by:  Vinay Ramesh MD        Dose:  1 each   1 each (68 mg) by Subdermal route once for 1 dose   Quantity:  1 each   Refills:  0       * Notice:  This list has 2 medication(s) that are the same as other medications prescribed for you. Read the directions carefully, and  ask your doctor or other care provider to review them with you.         Where to get your medicines      Some of these will need a paper prescription and others can be bought over the counter.  Ask your nurse if you have questions.     You don't need a prescription for these medications     etonogestrel 68 MG Impl                Primary Care Provider Office Phone # Fax #    Vinay Ramesh [phone] [address] [address]        Equal Access to Services     ASHLIE MCNAMARA : Hadii aad ku hadasho Soomaali, waaxda luqadaha, qaybta kaalmada adeegyada, waxay idiin hayaan adeeg kharash la'ilan . So [facility] [phone].    ATENCIÓN: Si habla español, tiene a felix disposición servicios gratuitos de asistencia lingüística. [facility] [phone].    We comply with applicable federal civil rights laws and [place] laws. We do not discriminate on the basis of race, color, national origin, age, disability, sex, sexual orientation, or gender identity.            Thank you!     Thank you for choosing [facility]  for your care. Our goal is always to provide you with excellent care. Hearing back from our patients is one way we can continue to improve our services. Please take a few minutes to complete the written survey that you may receive in the mail after your visit with us. Thank you!             Your Updated Medication List - Protect others around you: Learn how to safely use, store and throw away your medicines at www.disposemymeds.org.          This list is accurate as of: 10/9/17  5:05 PM.  Always use your most recent med list.                   Brand Name Dispense Instructions for use Diagnosis    * etonogestrel 68 MG Impl    IMPLANON    1 each    Implantable device, remove and reinsert in 3 years    Contraception, Nexplanon insertion       * etonogestrel 68 MG Impl    IMPLANON/NEXPLANON    1 each    1 each (68 mg) by Subdermal route once for 1 dose    Encounter for  surveillance of Nexplanon subdermal contraceptive       hydrocortisone 2.5 % ointment     30 g    Apply topically 2 times daily    Dermatitis       ketoconazole 2 % cream    NIZORAL    30 g    Apply twice daily to rash on body for 2-3 weeks or until resolved.    TV (tinea versicolor)       tacrolimus 0.1 % ointment    PROTOPIC    60 g    Apply twice daily to rash on eyelids    Dermatitis of eyelids of both eyes       triamcinolone 0.1 % ointment    KENALOG    30 g    Apply to areas of eczema 2 times per day until improved. Use as needed. Do not apply to the face, armpits, or groin.    CD (contact dermatitis)       * Notice:  This list has 2 medication(s) that are the same as other medications prescribed for you. Read the directions carefully, and ask your doctor or other care provider to review them with you.

## 2017-10-09 NOTE — PROGRESS NOTES
Zeinab Angel is here for annual physical with PAP.  Last period 2014 due to Nexplanon implant. She is due for replacement in December 2017. She will check with her insurance for coverage.  Plantar wart on bottom of left foot  Migraines are better only once per year if not enough sleep or dehydrated.  ROS: Has occasional pressure sensation in the left ear.    Patient Active Problem List   Diagnosis     Contraception     Rash     Migraine headache with aura     Screening for malignant neoplasm of cervix       Past Medical History:   Diagnosis Date     Abnormal pap      Hypertension 2014       Past Surgical History:   Procedure Laterality Date     NO HISTORY OF SURGERY         Current Outpatient Prescriptions   Medication Sig Dispense Refill     ketoconazole (NIZORAL) 2 % cream Apply twice daily to rash on body for 2-3 weeks or until resolved. 30 g 2     tacrolimus (PROTOPIC) 0.1 % ointment Apply twice daily to rash on eyelids 60 g 0     hydrocortisone 2.5 % ointment Apply topically 2 times daily 30 g 3     triamcinolone (KENALOG) 0.1 % ointment Apply to areas of eczema 2 times per day until improved. Use as needed. Do not apply to the face, armpits, or groin. 30 g 2     etonogestrel (IMPLANON) 68 MG IMPL Implantable device, remove and reinsert in 3 years 1 each 0       Immunization History   Administered Date(s) Administered     TDAP Vaccine (Boostrix) 12/01/2014       Allergies   Allergen Reactions     Sulfa Drugs Hives       Social History     Social History     Marital status: Single     Spouse name: N/A     Number of children: N/A     Years of education: N/A     Occupational History     admin      [employer]     Social History Main Topics     Smoking status: Never Smoker     Smokeless tobacco: Never Used     Alcohol use Yes      Comment: 3-4 per week     Drug use: No     Sexual activity: Yes     Partners: Male     Birth control/ protection: Implant     Other Topics Concern     Not on file     Social History  Narrative    Currently working for [employer]. Originally from [place], [place]    How much exercise per week? 4-5    How much calcium per day? In foods       How much caffeine per day? 1 cup    How much vitamin D per day? In foods    Do you/your family wear seatbelts?  Yes    Do you/your family use safety helmets? Yes    Do you/your family use sunscreen? Yes    Do you/your family keep firearms in the home? No    Do you/your family have a smoke detector(s)? Yes        Do you feel safe in your home? Yes    Has anyone ever touched you in an unwanted manner? No     Reviewed JDS 8/8/16           Family History   Problem Relation Age of Onset     GASTROINTESTINAL DISEASE Mother      diverticulitis     CANCER Maternal Grandfather      leukemia     Neurologic Disorder Maternal Grandmother      parkinsons     Hypertension Maternal Uncle 30     CANCER Mother 54     renal cancer     CANCER Maternal Grandmother      skin cancer     DIABETES Maternal Grandfather      Hypertension Father        Answers for HPI/ROS submitted by the patient on 10/4/2017   General Symptoms: No  Skin Symptoms: Yes Plantar warts left foot  HENT Symptoms: Yes Left ear pressure  EYE SYMPTOMS: No  HEART SYMPTOMS: Yes occasional pressure sensation if anxious  LUNG SYMPTOMS: No  INTESTINAL SYMPTOMS: No  URINARY SYMPTOMS: No  GYNECOLOGIC SYMPTOMS: No  BREAST SYMPTOMS: No  SKELETAL SYMPTOMS: No  BLOOD SYMPTOMS: No  NERVOUS SYSTEM SYMPTOMS: No  MENTAL HEALTH SYMPTOMS: No  Warts: Yes left foot  Ear pain: Yes  Chest pain or pressure: Yes Yes occasional pressure sensation if anxious  Chest pain on exertion: Yes  Fast heart beat: Yes  Heart flutters: Yes  Exercise intolerance: Yes      Remaining 10 point ROS of systems including Constitutional, Eyes, Respiratory, Cardiovascular, Gastroenterology, Genitourinary, Integumentary, Musculoskeletal, Neurological, Psychiatric were all negative except for pertinent positives noted in HPI and ROS reviewed above.  BP (!)  137/94  Pulse 75  Resp 16  Wt 66.2 kg (146 lb)  SpO2 98%  Breastfeeding? No  BMI 26.36 kg/m2  Constitutional: Oriented to person, place, and time. Vital signs are noted.  Appears well-developed and well-nourished. Non-toxic appearance.  No distress.   HENT:  Bilateral TM normal. External canal normal.  Head: Normocephalic and atraumatic.   Mouth/Throat: Oropharynx is clear and moist. No oropharyngeal exudate. Mild tonsillar enlargement no exudate   Eyes: Conjunctivae and EOM are normal. Pupils are equal, round, and reactive to light. No scleral icterus.   Neck: Normal range of motion. Neck supple. No JVD present. No tracheal deviation present. No thyromegaly present.   Cardiovascular: Regular rhythm, normal heart sounds and intact distal pulses. No murmur present. Exam reveals no gallop and no friction rub.   Pulmonary/Chest: Effort normal and breath sounds normal. No respiratory distress.   Abdominal: Soft. Bowel sounds are normal. No distension and no mass. No tenderness.   Musculoskeletal: Normal range of motion. No edema and no tenderness.   Lymphadenopathy:   No cervical adenopathy.   Neurological: Alert and oriented to person, place, and time. Normal strength. No cranial nerve deficit or sensory deficit.   Skin: Plantar warts left foot ball and between 4th and 5th. Skin is warm and dry. No rash noted. No erythema. No pallor. Few nevi stable  Psychiatric: Normal mood, affect and behavior is normal.   Inspection and palpation of breasts: No masses, lumps, tenderness, normal symmetry, no nipple discharge  External genitalia: Normal general appearance, normal hair distribution shaved, no lesions   Urethral meatus: Normal location no lesions, or prolapse.  Urethra:No tenderness or scarring   Bladder: no fullness, masses, or tenderness   Vagina :Normal general appearance for age, appropriate estrogen effect, no discharge, no lesions, normal pelvic support,  No cystocele, or rectocele  Cervix: general  appearance nulliparous, no lesions, or discharge  Uterus :Normal size, contour, position, normal mobility, no tenderness, appropriate support Adnexa/parametria No masses, tenderness, organomegaly, or nodularity  Anus and perineum: normal to inspection, no rash  Zeinab was seen today for physical and plantar warts left foot. Last period 2014 due to Nexplanon implant. She is due for replacement in December 2017. She will check with her insurance for coverage and return for removal and replacement same day.    Diagnoses and all orders for this visit:    Routine general medical examination at a health care facility  -     Pap imaged thin layer screen reflex to HPV if ASCUS - recommend age 25 - 29    Encounter for surveillance of Nexplanon subdermal contraceptive  Comments:  needs removal and replacement in Decmber 2017  Orders:  -     etonogestrel (IMPLANON/NEXPLANON) 68 MG IMPL; 1 each (68 mg) by Subdermal route once for 1 dose  -     HCG qualitative urine; Future    Plantar warts  Treated with freeze thaw method x3 tolerated well.      All questions were addressed and voiced understanding and agreement with the above.    Vinay Ramesh MD

## 2017-10-12 LAB
COPATH REPORT: NORMAL
PAP: NORMAL

## 2017-12-04 ENCOUNTER — OFFICE VISIT (OUTPATIENT)
Dept: FAMILY MEDICINE | Facility: CLINIC | Age: 27
End: 2017-12-04

## 2017-12-04 VITALS
TEMPERATURE: 98.6 F | DIASTOLIC BLOOD PRESSURE: 81 MMHG | BODY MASS INDEX: 26.72 KG/M2 | RESPIRATION RATE: 16 BRPM | HEART RATE: 85 BPM | OXYGEN SATURATION: 99 % | SYSTOLIC BLOOD PRESSURE: 135 MMHG | WEIGHT: 148 LBS

## 2017-12-04 DIAGNOSIS — Z30.017 NEXPLANON INSERTION: ICD-10-CM

## 2017-12-04 DIAGNOSIS — Z30.46 ENCOUNTER FOR SURVEILLANCE OF NEXPLANON SUBDERMAL CONTRACEPTIVE: ICD-10-CM

## 2017-12-04 DIAGNOSIS — Z30.46 NEXPLANON REMOVAL: Primary | ICD-10-CM

## 2017-12-04 LAB — HCG UR QL: NEGATIVE

## 2017-12-04 RX ORDER — LIDOCAINE HYDROCHLORIDE AND EPINEPHRINE 15; 5 MG/ML; UG/ML
3 INJECTION, SOLUTION EPIDURAL ONCE
Qty: 3 ML | Refills: 0 | OUTPATIENT
Start: 2017-12-04 | End: 2017-12-04

## 2017-12-04 ASSESSMENT — PAIN SCALES - GENERAL: PAINLEVEL: NO PAIN (0)

## 2017-12-04 NOTE — MR AVS SNAPSHOT
After Visit Summary   12/4/2017    Zeinab Angel    MRN: [id number]           Patient Information     Date Of Birth          1990        Visit Information        Provider Department      12/4/2017 10:00 AM Vinay Ramesh MD [facility]        Today's Diagnoses     Nexplanon removal    -  1    Nexplanon insertion          Care Instructions    NEXPLANON AFTERCARE INSTRUCTIONS     You may have some pain at the site of the Nexplanon insertion. You can help relieve the discomfort with Tylenol (acetaminophen), Aspirin or Advil (ibuprofen). If your discomfort worsens or you notice redness spreading on the skin around the insertion site, please call the clinic.       Irregular bleeding is common with Nexplanon, especially in the first 6-12 months of use. After one year, approximately 20% of women who use Nexplanon will stop having periods completely. Some women have longer, heavier periods. Some women will have increased spotting between periods. You may find that your periods may be hard to predict.       The Nexplanon does not protect against sexually transmitted infections including the AIDS virus (HIV), warts (HPV), gonorrhea, Chlamydia, and herpes. Condoms should be used to decrease the risk sexually transmitted infections. If you think that you have been exposed to a sexually transmitted infection, please call the clinic.       If you had Nexplanon placed for birth control, it is effective immediately if it was inserted within five days after the start of your period. If you have Nexplanon inserted at any other time during your menstrual cycle, use another method of birth control, like condoms for at least 7 days.       The Nexplanon should be removed and/or replaced by a health care provider after three years.   Warning Signs   Call the clinic if any of the following occurs:     You have bleeding, pus, or increasing redness, or pain at insertion site.     You have fever or  chills     The implant comes out or you have concerns about its location.     You have a positive pregnancy test or suspect you might be pregnant.             Follow-ups after your visit        Your next 10 appointments already scheduled     Dec 28, 2017  8:00 AM CST   (Arrive by 7:45 AM)   Return Visit with Julio César Dockery MD   [facility] ([facility] [place])    [address]  [address] [facility] [address] [phone]              Who to contact     Please call your clinic at [phone] to:    Ask questions about your health    Make or cancel appointments    Discuss your medicines    Learn about your test results    Speak to your doctor   If you have compliments or concerns about an experience at your clinic, or if you wish to file a complaint, please contact [facility] Patient Relations at [phone] or email us at [email or web address]         Additional Information About Your Visit        The [employer] Information     VD[employer] gives you secure access to your electronic health record. If you see a primary care provider, you can also send messages to your care team and make appointments. If you have questions, please call your primary care clinic.  If you do not have a primary care provider, please call [phone] and they will assist you.      [employer] is an electronic gateway that provides easy, online access to your medical records. With [employer], you can request a clinic appointment, read your test results, renew a prescription or communicate with your care team.     To access your existing account, please contact your [facility] Clinic or call [phone] for assistance.        Care EveryWhere ID     This is your Care EveryWhere ID. This could be used by other organizations to access your [place] medical records  [id number]        Your Vitals Were     Pulse Temperature Respirations Pulse Oximetry  Breastfeeding? BMI (Body Mass Index)    85 98.6  F (37  C) (Oral) 16 99% No 26.72 kg/m2       Blood Pressure from Last 3 Encounters:   12/04/17 135/81   10/09/17 (!) 135/91   10/03/16 (!) 136/93    Weight from Last 3 Encounters:   12/04/17 67.1 kg (148 lb)   10/09/17 66.2 kg (146 lb)   01/19/17 64.9 kg (143 lb)              Today, you had the following     No orders found for display       Primary Care Provider Office Phone # Fax #    Vinay Ramesh [phone] [address] [facility] [address]        Equal Access to Services     ASHLIE MCNAMARA : Rocio espinozao Sojohan, waaxda luqadaha, qaybta kaalmada adeegyada, anthony haney . So [facility] [phone].    ATENCIÓN: Si habla español, tiene a felix disposición servicios gratuitos de asistencia lingüística. [facility] [phone].    We comply with applicable federal civil rights laws and [place] laws. We do not discriminate on the basis of race, color, national origin, age, disability, sex, sexual orientation, or gender identity.            Thank you!     Thank you for choosing [facility]  for your care. Our goal is always to provide you with excellent care. Hearing back from our patients is one way we can continue to improve our services. Please take a few minutes to complete the written survey that you may receive in the mail after your visit with us. Thank you!             Your Updated Medication List - Protect others around you: Learn how to safely use, store and throw away your medicines at www.disposemymeds.org.          This list is accurate as of: 12/4/17 11:10 AM.  Always use your most recent med list.                   Brand Name Dispense Instructions for use Diagnosis    etonogestrel 68 MG Impl    IMPLANON    1 each    Implantable device, remove and reinsert in 3 years    Contraception, Nexplanon insertion       hydrocortisone 2.5 % ointment     30 g    Apply topically 2 times  daily    Dermatitis       ketoconazole 2 % cream    NIZORAL    30 g    Apply twice daily to rash on body for 2-3 weeks or until resolved.    TV (tinea versicolor)       tacrolimus 0.1 % ointment    PROTOPIC    60 g    Apply twice daily to rash on eyelids    Dermatitis of eyelids of both eyes       triamcinolone 0.1 % ointment    KENALOG    30 g    Apply to areas of eczema 2 times per day until improved. Use as needed. Do not apply to the face, armpits, or groin.    CD (contact dermatitis)

## 2017-12-04 NOTE — PATIENT INSTRUCTIONS
NEXPLANON AFTERCARE INSTRUCTIONS     You may have some pain at the site of the Nexplanon insertion. You can help relieve the discomfort with Tylenol (acetaminophen), Aspirin or Advil (ibuprofen). If your discomfort worsens or you notice redness spreading on the skin around the insertion site, please call the clinic.       Irregular bleeding is common with Nexplanon, especially in the first 6-12 months of use. After one year, approximately 20% of women who use Nexplanon will stop having periods completely. Some women have longer, heavier periods. Some women will have increased spotting between periods. You may find that your periods may be hard to predict.       The Nexplanon does not protect against sexually transmitted infections including the AIDS virus (HIV), warts (HPV), gonorrhea, Chlamydia, and herpes. Condoms should be used to decrease the risk sexually transmitted infections. If you think that you have been exposed to a sexually transmitted infection, please call the clinic.       If you had Nexplanon placed for birth control, it is effective immediately if it was inserted within five days after the start of your period. If you have Nexplanon inserted at any other time during your menstrual cycle, use another method of birth control, like condoms for at least 7 days.       The Nexplanon should be removed and/or replaced by a health care provider after three years.   Warning Signs   Call the clinic if any of the following occurs:     You have bleeding, pus, or increasing redness, or pain at insertion site.     You have fever or chills     The implant comes out or you have concerns about its location.     You have a positive pregnancy test or suspect you might be pregnant.

## 2017-12-04 NOTE — NURSING NOTE
Chief Complaint   Patient presents with     Contraception     Patient is here for nexplanon replacement     Andres Marcos CMA (AAMA) at 10:06 AM on 12/4/2017

## 2017-12-04 NOTE — PROGRESS NOTES
[facility]  Procedure Note-Nexplanon Removal    Zeinab Angel is a patient of Vinay South here for removal of etonogestrel implant Nexplanon/Implanon    Indication: Removal Nexplanon    Consent: Affirmation of informed consent was signed and scanned into the medical record. Risks, benefits and alternatives were discussed. Patient's questions were elicited and answered.   Procedure safety checklist was completed:  Yes  Time Out (Pause for the Cause) completed: Yes      Preoperative Diagnosis: etonogestrel implant  Postoperative Diagnosis: etonogestrel implant removed    Technique: On the left arm  Skin prep Betadine  Anesthesia 1% lidocaine, with epi  Procedure: Small incision (<5mm) was made at distal end of palpable implant, curved hemostat was used to isolate the implant and bring it to the incision, the fibrous capsule containing the implant  was incised and the Implant was removed intact.  EBL: minimal  Complications:  No  Tolerance:  Pt tolerated procedure well and was in stable condition.     Contraception was discussed and patient chose the following method Nexplanon replacement      Follow up: Pt was instructed to call if bleeding, severe pain or foul smell.     Follow up in 1 year.    Vinay Ramesh MD      [facility]  Procedure Note - Etonogestrel Implant Insertion     HPI: Zeinab Angel is a patient of Vinay South here for Nexplanon/Implanon (etonogestrel implant) insertion.   Indication: unwanted fertility  LMP   none  STI history:   none  Prev Contraception? Nexplanon removed today and UPT negative  Smoking?  No    Counselling and Consent:  Affirmation of informed consent was signed and scanned into the medical record. Risks, benefits and alternatives were discussed. Discussed potential side effects of the etonogestrel implant including the risk of irregular bleeding that may persist across the 3 yrs of use.  Instructed on use of condoms for  STI prevention.  Patient's questions were elicited and answered.      Procedure safety checklist was completed:  Yes  Time Out (Pause for the Cause) completed: Yes    Labs: UPT negative    Preoperative Diagnosis:  Unwanted fertility  Postoperative Diagnosis:  same     Technique:   Skin prep Betadine  Anesthesia 1% lidocaine, with epi  Suture  No   EBL:   minimal  Complications: No  Tolerance:  Pt tolerated procedure well and was in stable condition.     Pt was positioned on exam table with left arm flexed and externally rotated. Area was marked for insertion 8cm frm the medial epicondyle along the sulcus between the biceps and triceps. Anesthesia provided at the insertion site and along the insertion track and then the area was prepped with betadine. Etonogestrel implant was then inserted subdermally in usual fashion. Provider and patient confirmed placement by palpating the device. Pressure dressing applied and procedure complete.     Follow up:  Pt was instructed to call if bleeding, severe pain or foul smell.  Instructed to remove pressure dressing after 24 hours, then may keep insertion site covered with a bandaid until it is healed.  Instructed that she requires removal or replacement of the device in 3 years.  Lot Number [id number]    Follow up in 1 year.    Vinay Ramesh MD

## 2019-07-15 ENCOUNTER — OFFICE VISIT (OUTPATIENT)
Dept: FAMILY MEDICINE | Facility: CLINIC | Age: 29
End: 2019-07-15
Payer: COMMERCIAL

## 2019-07-15 VITALS
SYSTOLIC BLOOD PRESSURE: 132 MMHG | RESPIRATION RATE: 17 BRPM | TEMPERATURE: 98.2 F | WEIGHT: 153.6 LBS | HEART RATE: 80 BPM | DIASTOLIC BLOOD PRESSURE: 88 MMHG | OXYGEN SATURATION: 98 % | BODY MASS INDEX: 27.73 KG/M2

## 2019-07-15 DIAGNOSIS — B07.0 PLANTAR WARTS: Primary | ICD-10-CM

## 2019-07-15 ASSESSMENT — PAIN SCALES - GENERAL: PAINLEVEL: NO PAIN (0)

## 2019-07-15 NOTE — PROGRESS NOTES
"[facility]  Primary Care Center   Vinay Ramesh MD  07/15/2019      Chief Complaint:   Plantar Wart and Contraception     History of Present Illness:   Zeinab Angel is a 28 year old female with a history of plantar warts, migraines with aura resolved, who presents for evaluation of plantar warts and contraception.    Plantar Warts  She has a plantar wart on her left plantar surface remaining, under her smallest toe other warts resolved.     Contraception   She had a Nexplanon implant inserted on 12/4/17, but started experiencing periods 2-3 times a month after this spring. She is considering removing the Nexplanon implant. She has used oral contraception in the past, but had migraines with use. Last pap on 10/9/17 was negative due in 2020.    Other concerns discussed:  1. Blood Pressure - Her blood pressure is elevated today at 135/90. She exercises 5 times a week. Endorses feeling out of breath \"when she shouldn't be\", but is able to catch her breath in a normal time frame. Denies fatigue.   No other health concerns     Review of Systems:   Pertinent items are noted in HPI or as in patient entered ROS below, remainder of complete ROS is negative.     Active Medications:      triamcinolone (KENALOG) 0.1 % ointment, Apply to areas of eczema 2 times per day until improved. Use as needed. Do not apply to the face, armpits, or groin., Disp: 30 g, Rfl: 2     etonogestrel (IMPLANON) 68 MG IMPL, Implantable device, remove and reinsert in 3 years, Disp: 1 each, Rfl: 0     etonogestrel (IMPLANON/NEXPLANON) 68 MG IMPL, 1 each (68 mg) by Subdermal route once for 1 dose, Disp: 1 each, Rfl: 0     hydrocortisone 2.5 % ointment, Apply topically 2 times daily (Patient not taking: Reported on 7/15/2019), Disp: 30 g, Rfl: 3     ketoconazole (NIZORAL) 2 % cream, Apply twice daily to rash on body for 2-3 weeks or until resolved. (Patient not taking: Reported on 7/15/2019), Disp: 30 g, Rfl: 2     tacrolimus (PROTOPIC) 0.1 % ointment, " Apply twice daily to rash on eyelids (Patient not taking: Reported on 7/15/2019), Disp: 60 g, Rfl: 0      Allergies:   Sulfa drugs      Past Medical History:  Abnormal pap   Hypertension   Rash   Migraine with aura   Plantar warts     Past Surgical History:  History reviewed. No pertinent past surgical history.    Family History:   Mother - diverculitis, renal cancer   Maternal grandmother - leukemia, parkinsonism, skin cancer  Maternal grandfather - diabetes  Maternal uncle - hypertension   Father - hypertension       Social History:   The patient was alone.   Smoking Status: Never smoker    Smokeless Tobacco: Never used   Alcohol Use: Yes     Physical Exam:   /90 (BP Location: Right arm, Patient Position: Sitting, Cuff Size: Adult Regular)   Pulse 81   Temp 98.2  F (36.8  C) (Oral)   Resp 17   Wt 69.7 kg (153 lb 9.6 oz)   LMP 05/31/2019 (Approximate)   SpO2 98%   Breastfeeding? No   BMI 27.73 kg/m       Patient Vitals for the past 24 hrs:   BP Temp Temp src Pulse Resp SpO2 Weight   07/15/19 1219 132/88 -- -- 80 -- -- --   07/15/19 1158 135/90 98.2  F (36.8  C) Oral 81 17 98 % 69.7 kg (153 lb 9.6 oz)       Constitutional: Oriented to person, place, and time. Vital signs are noted.  Appears well-developed and well-nourished. Non-toxic appearance.  No distress.   Head: Normocephalic and atraumatic.   Eyes: Conjunctivae and EOM are normal. Pupils are equal, round, and reactive to light. No scleral icterus.   Neck: Normal range of motion. Neck supple. No JVD present. No tracheal deviation present. No thyromegaly present.   Cardiovascular: Regular rhythm, normal heart sounds and intact distal pulses. No murmur present. Exam reveals no gallop and no friction rub.   Pulmonary/Chest: Effort normal and breath sounds normal. No respiratory distress.   Musculoskeletal: Normal range of motion. No edema and no tenderness. Implant in left arm  Lymphadenopathy:   No cervical adenopathy.   Neurological: Alert and  oriented to person, place, and time. Normal strength. No cranial nerve deficit or sensory deficit.   Skin: Plantar wart 0.5 cm left foot between 4th and 5th, plantar surface. Skin is warm and dry. No rash noted. No erythema. No pallor. Few nevi stable  Psychiatric: Normal mood, affect and behavior is normal.      Assessment and Plan:  Zeinab Angel is a 28 year old female with a history of plantar warts, occasional elevated blood pressure, migraines with aura resolved who presents for evaluation of plantar warts and contraception. Used liquid nitrogen during visit to freeze  single wart on her left foot. Start OTC salicylic wart patches in one week, but advised to avoid using on open or macerated area. Further discussed periods while on long-term birth control is normal. She will continue to think about contraception options and schedule a future visit for removal or insertion as needed. Blood pressure slightly elevated, rechecked at end of visit (see above) acceptable. Reminded her to limit sodium intake.     Plantar warts  Left plantar wart treated with freeze/thaw method x3.      Follow-up: 1-2 months if prefers liquid nitrogen treatment for wart if remaining     Scribe Disclosure:  I, Yaneth Weinberg, am serving as a scribe to document services personally performed by Vinay Ramesh MD at this visit, based upon the provider's statements to me. All documentation has been reviewed by the aforementioned provider prior to being entered into the official medical record.     Portions of this medical record were completed by a scribe. UPON MY REVIEW AND AUTHENTICATION BY ELECTRONIC SIGNATURE, this confirms (a) I performed the applicable clinical services, and (b) the record is accurate.   Vinay Ramesh MD

## 2019-09-05 ENCOUNTER — TELEPHONE (OUTPATIENT)
Dept: FAMILY MEDICINE | Facility: CLINIC | Age: 29
End: 2019-09-05

## 2019-09-05 NOTE — TELEPHONE ENCOUNTER
Called left a VM, it was for wart and contraception, if it was a billing question she needs to contact them Olga Spivey Paramedic on 9/5/2019 at 1:34 PM

## 2019-09-05 NOTE — TELEPHONE ENCOUNTER
[facility] Call Center    Phone Message    May a detailed message be left on voicemail: yes    Reason for Call: Other: Per call from PT has questions re: 7/15 visit with Dr Ramesh. Per PT she thought the visit was supposed to be a preventative care visit but the billing is confusing.  Per PT she called the Billing Dept and was advised to call the clinic to speak with a clinic coordinator. Please reach out to the PT.      Action Taken: Message routed to:  [facility] ([facility] Primary Care

## 2020-03-02 ENCOUNTER — HEALTH MAINTENANCE LETTER (OUTPATIENT)
Age: 30
End: 2020-03-02

## 2020-11-24 ENCOUNTER — TELEPHONE (OUTPATIENT)
Dept: FAMILY MEDICINE | Facility: CLINIC | Age: 30
End: 2020-11-24

## 2020-11-24 NOTE — TELEPHONE ENCOUNTER
"Pt called back today to discuss her goals for the appointment on Dec. 7th, 2020 with Dr. Ramesh.     She currently only wants the nexplanon device removed at the time of her appointment. She states that she has been on some form of birth control for awhile and is ready to \"take a break.\"     Pt was eminded that since she decided on removal only, she would not be able to get the device reinserted at the same appointment since a prior authorization and urine pregnancy test would be required before insertion.     Pt verbalized her understanding and states that she will discuss other options with Dr. Ramesh at her visit and will schedule another appointment if she decides on another device (ie: IUD, neplanon).     Joyce Hung EMT at 12:03 PM sign on 11/24/2020     "

## 2020-12-07 ENCOUNTER — OFFICE VISIT (OUTPATIENT)
Dept: FAMILY MEDICINE | Facility: CLINIC | Age: 30
End: 2020-12-07
Payer: COMMERCIAL

## 2020-12-07 VITALS
WEIGHT: 158 LBS | HEART RATE: 82 BPM | OXYGEN SATURATION: 96 % | DIASTOLIC BLOOD PRESSURE: 87 MMHG | SYSTOLIC BLOOD PRESSURE: 146 MMHG | BODY MASS INDEX: 28.53 KG/M2

## 2020-12-07 DIAGNOSIS — Z30.46 ENCOUNTER FOR NEXPLANON REMOVAL: Primary | ICD-10-CM

## 2020-12-07 PROBLEM — Z30.017 NEXPLANON INSERTION: Status: RESOLVED | Noted: 2017-12-04 | Resolved: 2020-12-07

## 2020-12-07 PROCEDURE — 99207 PR NO CHARGE LOS: CPT | Performed by: FAMILY MEDICINE

## 2020-12-07 PROCEDURE — 11982 REMOVE DRUG IMPLANT DEVICE: CPT | Performed by: FAMILY MEDICINE

## 2020-12-07 RX ORDER — LIDOCAINE HYDROCHLORIDE AND EPINEPHRINE 10; 10 MG/ML; UG/ML
1 INJECTION, SOLUTION INFILTRATION; PERINEURAL ONCE
Status: COMPLETED | OUTPATIENT
Start: 2020-12-07 | End: 2020-12-07

## 2020-12-07 RX ADMIN — LIDOCAINE HYDROCHLORIDE AND EPINEPHRINE 1 ML: 10; 10 INJECTION, SOLUTION INFILTRATION; PERINEURAL at 12:26

## 2020-12-07 ASSESSMENT — PAIN SCALES - GENERAL: PAINLEVEL: NO PAIN (0)

## 2020-12-07 NOTE — NURSING NOTE
Chief Complaint   Patient presents with     Contraception     Pt comes in for nexplanon removal; ear pain resolved and ok with no PAP      Joyce THANIA Hung at 9:29 AM sign on 12/7/2020

## 2020-12-07 NOTE — PROGRESS NOTES
Procedure Note-Nexplanon Removal    Zeinab Angel is a patient of Vinay South here for removal of etonogestrel implant Nexplanon/Implanon    Indication: Desires no hormonal contraception requests removal LMP 12/2/2020  She tolerated well last placed 12/5/2017, she has occasional irregular menses     Consent: Affirmation of informed consent was signed and scanned into the medical record. Risks, benefits and alternatives were discussed. Patient's questions were elicited and answered.   Procedure safety checklist was completed:  Yes  Time Out (Pause for the Cause) completed: Yes      Preoperative Diagnosis: etonogestrel implant  Postoperative Diagnosis: etonogestrel implant removed  BP (!) 146/87   Pulse 82   Wt 71.7 kg (158 lb)   LMP 12/02/2020 (Approximate)   SpO2 96%   Breastfeeding No   BMI 28.53 kg/m   137/84 prior to procedure  Technique: On the left arm  Skin prep Betadine  Anesthesia 1% lidocaine, with epi 3 cc  Procedure: Small incision (<5mm) was made at distal end of palpable implant, curved hemostat was used to isolate the implant and bring it to the incision, the fibrous capsule containing the implant  was incised and the Implant was removed intact.  EBL: minimal  Complications:  Significant adhesions adherent to implant  Tolerance:  Pt tolerated procedure well and was in stable condition.     Contraception was discussed and patient chose no current method       Follow up: Pt was instructed to call if bleeding, severe pain or foul smell.     Requires PAP and pelvic when able after pandemic restrictions    Vinay Ramesh MD

## 2020-12-20 ENCOUNTER — HEALTH MAINTENANCE LETTER (OUTPATIENT)
Age: 30
End: 2020-12-20

## 2021-04-24 ENCOUNTER — HEALTH MAINTENANCE LETTER (OUTPATIENT)
Age: 31
End: 2021-04-24

## 2021-08-24 NOTE — PATIENT INSTRUCTIONS

## 2021-08-24 NOTE — PROGRESS NOTES
SUBJECTIVE:   CC: Zeinab Angel is an 30 year old woman who presents for preventive health visit.     Patient has been advised of split billing requirements and indicates understanding: Yes  HPI   Zeinab Angel is a 31 yo female presents for physical PAP and pelvic. She had nexplanom removal does not require contraception. She has noted reflux symptoms (GERD) taking tums assists sometimes pain is referred to her back. She has been drinking on the weekends with friends up to 5 Seltzers at a time, does not know if this worsens would be able to lower alcohol use   She has tinea versicolor of skin has tried OTC dandruff shampoo without much assistance.   BP sometimes elevated at visits does not have a home BP monitor.  HCM  Completed Fugoo vaccine in April in [place]  HIV and Hep C screening is low risk declines  Advanced directive discussed  Today's PHQ-2 Score:   PHQ-2 ( 1999 Pfizer) 8/25/2021   Q1: Little interest or pleasure in doing things 0   Q2: Feeling down, depressed or hopeless 0   PHQ-2 Score 0   Q1: Little interest or pleasure in doing things -   Q2: Feeling down, depressed or hopeless -   PHQ-2 Score -       Abuse: Current or Past (Physical, Sexual or Emotional) - No  Do you feel safe in your environment? Yes    Have you ever done Advance Care Planning? (For example, a Health Directive, POLST, or a discussion with a medical provider or your loved ones about your wishes): No, advance care planning information given to patient to review.  Advanced care planning was discussed at today's visit.    Social History     Tobacco Use     Smoking status: Never Smoker     Smokeless tobacco: Never Used   Substance Use Topics     Alcohol use: Yes     Comment: 3-4 per week up to 5 per session     If you drink alcohol do you typically have >3 drinks per day or >7 drinks per week? Yes only on the weekend willing to lower        Reviewed orders with patient.  Reviewed health maintenance and updated orders  accordingly - Yes  Labs reviewed in EPIC  BP Readings from Last 3 Encounters:   08/25/21 (!) 128/91   12/07/20 (!) 146/87   07/15/19 132/88    Wt Readings from Last 3 Encounters:   08/25/21 71.2 kg (157 lb)   12/07/20 71.7 kg (158 lb)   07/15/19 69.7 kg (153 lb 9.6 oz)               Immunization History   Administered Date(s) Administered     COVID-19,PF,[place] 04/09/2021     Influenza Vaccine IM > 6 months Valent IIV4 09/28/2020     TDAP Vaccine (Boostrix) 12/01/2014        Patient Active Problem List   Diagnosis     Contraception     Rash     Migraine headache with aura     Screening for malignant neoplasm of cervix     Plantar warts     Past Surgical History:   Procedure Laterality Date     nexplanon removal Left 12/07/2020     NO HISTORY OF SURGERY         Social History     Tobacco Use     Smoking status: Never Smoker     Smokeless tobacco: Never Used   Substance Use Topics     Alcohol use: Yes     Comment: 3-4 per week up to 5 per session     Family History   Problem Relation Age of Onset     Gastrointestinal Disease Mother         diverticulitis     Cancer Mother 54        renal cancer      Hypertension Father      Diabetes Type 2  Father      No Known Problems Brother      Neurologic Disorder Maternal Grandmother         parkinsons     Cancer Maternal Grandmother         skin cancer     Cancer Maternal Grandfather         leukemia     Diabetes Maternal Grandfather      Hypertension Maternal Uncle 30         Current Outpatient Medications   Medication Sig Dispense Refill     famotidine (PEPCID) 20 MG tablet Take 1 tablet (20 mg) by mouth 2 times daily as needed (GERD) 60 tablet 1     ketoconazole (NIZORAL) 2 % external shampoo Apply small amount twice weekly for 5-10 minutes in shower then rinse 120 mL 3     triamcinolone (KENALOG) 0.1 % ointment Apply to areas of eczema 2 times per day until improved. Use as needed. Do not apply to the face, armpits, or groin. 30 g 2     Allergies   Allergen Reactions      Sulfa Drugs Hives     Social History     Socioeconomic History     Marital status: Single     Spouse name: Not on file     Number of children: Not on file     Years of education: Not on file     Highest education level: Not on file   Occupational History     Occupation: admin     Comment: [employer]   Tobacco Use     Smoking status: Never Smoker     Smokeless tobacco: Never Used   Vaping Use     Vaping Use: Never used   Substance and Sexual Activity     Alcohol use: Yes     Comment: 3-4 per week up to 5 per session     Drug use: No     Comment: lenazer     Sexual activity: Not Currently     Partners: Male   Other Topics Concern     Parent/sibling w/ CABG, MI or angioplasty before 65F 55M? Not Asked   Social History Narrative    Currently working for [employer]. Originally from [place], [place]     No current partner     Social Determinants of Health     Financial Resource Strain: Low Risk      Difficulty of Paying Living Expenses: Not hard at all   Food Insecurity: No Food Insecurity     Worried About Running Out of Food in the Last Year: Never true     Ran Out of Food in the Last Year: Never true   Transportation Needs: No Transportation Needs     Lack of Transportation (Medical): No     Lack of Transportation (Non-Medical): No   Physical Activity: Sufficiently Active     Days of Exercise per Week: 5 days     Minutes of Exercise per Session: 50 min   Stress: No Stress Concern Present     Feeling of Stress : Not at all   Social Connections: Socially Isolated     Frequency of Communication with Friends and Family: Three times a week     Frequency of Social Gatherings with Friends and Family: Three times a week     Attends [employer] Services: Never     Active Member of Clubs or Organizations: No     Attends Club or Organization Meetings: Never     Marital Status: Never    Intimate Partner Violence: Not At Risk     Fear of Current or Ex-Partner: No     Emotionally Abused: No     Physically Abused: No     Sexually  "Abused: No     Recent Labs   Lab Test 10/23/14  1143   ALT 27   CR 0.77   GFRESTIMATED >90  Non  GFR Calc     GFRESTBLACK >90   GFR Calc     POTASSIUM 4.0        Breast Cancer Screening:  Any new diagnosis of family breast, ovarian, or bowel cancer? No    FHS-7: No flowsheet data found.      Pertinent mammograms are reviewed under the imaging tab NA    History of abnormal Pap smear: NO - age 30-65 PAP every 5 years with negative HPV co-testing recommended  PAP / HPV 10/9/2017 12/2/2013   PAP (Historical) NIL NIL     Reviewed and updated as needed this visit by clinical staff  Tobacco  Allergies  Meds  Problems  Med Hx  Surg Hx  Fam Hx  Soc Hx          Reviewed and updated as needed this visit by Provider  Tobacco  Allergies  Meds  Problems  Med Hx  Surg Hx  Fam Hx  Soc Hx             Review of Systems   Problem list, PMH, Surgical HX, FH, SH, allergies, medications,immunizations reviewed and updated in Epic. 10 point ROS negative other than noted in HPI and ROS.         OBJECTIVE:   BP (!) 128/91 (BP Location: Right arm, Patient Position: Sitting, Cuff Size: Adult Regular)   Pulse 94   Ht 1.6 m (5' 3\")   Wt 71.2 kg (157 lb)   LMP 08/11/2021   SpO2 98%   BMI 27.81 kg/m    Physical Exam  GENERAL: healthy, alert and no distress  EYES: Eyes grossly normal to inspection, PERRL and conjunctivae and sclerae normal  HENT: ear canals and TM's normal, mask removed briefly mouth without ulcers or lesions  NECK: no adenopathy, no asymmetry, masses, or scars and thyroid normal to palpation  RESP: lungs clear to auscultation - no rales, rhonchi or wheezes  BREAST: normal without masses, tenderness or nipple discharge and no palpable axillary masses or adenopathy  CV: regular rate and rhythm, normal S1 S2, no S3 or S4, no murmur, click or rub, no peripheral edema and peripheral pulses strong  ABDOMEN: soft, nontender, no hepatosplenomegaly, no masses and bowel sounds normal   " (female): normal female external genitalia shaved, normal urethral meatus, vaginal mucosa pink, moist, well rugated, and normal cervix/adnexa/uterus without masses or discharge  MS: no gross musculoskeletal defects noted, no edema  SKIN: variation in color to skin of abdomen and back, tan complexion, no suspicious lesions or rashes  NEURO: Normal strength and tone, mentation intact and speech normal  PSYCH: mentation appears normal, affect normal/bright  LYMPH: no cervical, supraclavicular, axillary, or inguinal adenopathy        ASSESSMENT/PLAN:   (Z00.00) Routine general medical examination at a health care facility  (primary encounter diagnosis)  Comment: Health female does not need contraception      (Z12.4) Screening for cervical cancer  Comment: obtained  Plan: Pap screen with HPV - recommended age 30 - 65  years        Await results    (K21.9) Gastroesophageal reflux disease without esophagitis  Comment: Intermittent try pepcid up to twice daily iof needed, lower alcohol contact me if persisting would need EGD potentially  Plan: famotidine (PEPCID) 20 MG tablet            (B36.0) Tinea versicolor  Comment: She has tried topical OTC dandruff shampoo  Plan: ketoconazole (NIZORAL) 2 % external shampoo apply twice weekly to skin leave on for 10 minute then wash off body.           (R03.0) Elevated BP without diagnosis of hypertension  Comment: May have office hypertension but also lower alcohol, family HX HTN  Plan: Home Blood Pressure Monitor Order for DME -         ONLY FOR DME              Patient has been advised of split billing requirements and indicates understanding: Yes  COUNSELING:  Reviewed preventive health counseling, as reflected in patient instructions       Regular exercise       Healthy diet/nutrition       Alcohol Use       Advance Care Planning       Immunizations Flu vaccine in fall stay tuned to if Booster needed for [place] vaccine    Estimated body mass index is 27.81 kg/m  as calculated  "from the following:    Height as of this encounter: 1.6 m (5' 3\").    Weight as of this encounter: 71.2 kg (157 lb).    Weight management plan: Discussed healthy diet and exercise guidelines    She reports that she has never smoked. She has never used smokeless tobacco.      Counseling Resources:  ATP IV Guidelines  Pooled Cohorts Equation Calculator  Breast Cancer Risk Calculator  BRCA-Related Cancer Risk Assessment: FHS-7 Tool  FRAX Risk Assessment  ICSI Preventive Guidelines  Dietary Guidelines for Americans, 2010  USDA's MyPlate  ASA Prophylaxis  Lung CA Screening    Vinay Ramesh MD  [facility] [place]  "

## 2021-08-25 ENCOUNTER — OFFICE VISIT (OUTPATIENT)
Dept: FAMILY MEDICINE | Facility: CLINIC | Age: 31
End: 2021-08-25
Payer: COMMERCIAL

## 2021-08-25 VITALS
HEART RATE: 94 BPM | WEIGHT: 157 LBS | HEIGHT: 63 IN | DIASTOLIC BLOOD PRESSURE: 91 MMHG | BODY MASS INDEX: 27.82 KG/M2 | SYSTOLIC BLOOD PRESSURE: 128 MMHG | OXYGEN SATURATION: 98 %

## 2021-08-25 DIAGNOSIS — K21.9 GASTROESOPHAGEAL REFLUX DISEASE WITHOUT ESOPHAGITIS: ICD-10-CM

## 2021-08-25 DIAGNOSIS — Z12.4 SCREENING FOR CERVICAL CANCER: ICD-10-CM

## 2021-08-25 DIAGNOSIS — B36.0 TINEA VERSICOLOR: ICD-10-CM

## 2021-08-25 DIAGNOSIS — Z00.00 ROUTINE GENERAL MEDICAL EXAMINATION AT A HEALTH CARE FACILITY: Primary | ICD-10-CM

## 2021-08-25 DIAGNOSIS — R03.0 ELEVATED BP WITHOUT DIAGNOSIS OF HYPERTENSION: ICD-10-CM

## 2021-08-25 PROCEDURE — G0145 SCR C/V CYTO,THINLAYER,RESCR: HCPCS | Performed by: FAMILY MEDICINE

## 2021-08-25 PROCEDURE — 87624 HPV HI-RISK TYP POOLED RSLT: CPT | Mod: 90 | Performed by: PATHOLOGY

## 2021-08-25 PROCEDURE — 99000 SPECIMEN HANDLING OFFICE-LAB: CPT | Performed by: PATHOLOGY

## 2021-08-25 PROCEDURE — 99395 PREV VISIT EST AGE 18-39: CPT | Performed by: FAMILY MEDICINE

## 2021-08-25 RX ORDER — KETOCONAZOLE 20 MG/ML
SHAMPOO TOPICAL
Qty: 120 ML | Refills: 3 | Status: SHIPPED | OUTPATIENT
Start: 2021-08-25

## 2021-08-25 RX ORDER — FAMOTIDINE 20 MG/1
20 TABLET, FILM COATED ORAL 2 TIMES DAILY PRN
Qty: 60 TABLET | Refills: 1 | Status: SHIPPED | OUTPATIENT
Start: 2021-08-25

## 2021-08-25 SDOH — HEALTH STABILITY: PHYSICAL HEALTH: ON AVERAGE, HOW MANY MINUTES DO YOU ENGAGE IN EXERCISE AT THIS LEVEL?: 50 MIN

## 2021-08-25 SDOH — ECONOMIC STABILITY: TRANSPORTATION INSECURITY
IN THE PAST 12 MONTHS, HAS THE LACK OF TRANSPORTATION KEPT YOU FROM MEDICAL APPOINTMENTS OR FROM GETTING MEDICATIONS?: NO

## 2021-08-25 SDOH — HEALTH STABILITY: PHYSICAL HEALTH: ON AVERAGE, HOW MANY DAYS PER WEEK DO YOU ENGAGE IN MODERATE TO STRENUOUS EXERCISE (LIKE A BRISK WALK)?: 5 DAYS

## 2021-08-25 SDOH — ECONOMIC STABILITY: FOOD INSECURITY: WITHIN THE PAST 12 MONTHS, THE FOOD YOU BOUGHT JUST DIDN'T LAST AND YOU DIDN'T HAVE MONEY TO GET MORE.: NEVER TRUE

## 2021-08-25 SDOH — ECONOMIC STABILITY: FOOD INSECURITY: WITHIN THE PAST 12 MONTHS, YOU WORRIED THAT YOUR FOOD WOULD RUN OUT BEFORE YOU GOT MONEY TO BUY MORE.: NEVER TRUE

## 2021-08-25 SDOH — ECONOMIC STABILITY: TRANSPORTATION INSECURITY
IN THE PAST 12 MONTHS, HAS LACK OF TRANSPORTATION KEPT YOU FROM MEETINGS, WORK, OR FROM GETTING THINGS NEEDED FOR DAILY LIVING?: NO

## 2021-08-25 SDOH — ECONOMIC STABILITY: INCOME INSECURITY: IN THE LAST 12 MONTHS, WAS THERE A TIME WHEN YOU WERE NOT ABLE TO PAY THE MORTGAGE OR RENT ON TIME?: NO

## 2021-08-25 ASSESSMENT — SOCIAL DETERMINANTS OF HEALTH (SDOH)
WITHIN THE LAST YEAR, HAVE YOU BEEN HUMILIATED OR EMOTIONALLY ABUSED IN OTHER WAYS BY YOUR PARTNER OR EX-PARTNER?: NO
HOW HARD IS IT FOR YOU TO PAY FOR THE VERY BASICS LIKE FOOD, HOUSING, MEDICAL CARE, AND HEATING?: NOT HARD AT ALL
HOW OFTEN DO YOU GET TOGETHER WITH FRIENDS OR RELATIVES?: THREE TIMES A WEEK
DO YOU BELONG TO ANY CLUBS OR ORGANIZATIONS SUCH AS CHURCH GROUPS UNIONS, FRATERNAL OR ATHLETIC GROUPS, OR SCHOOL GROUPS?: NO
ARE YOU MARRIED, WIDOWED, DIVORCED, SEPARATED, NEVER MARRIED, OR LIVING WITH A PARTNER?: NEVER MARRIED
HOW OFTEN DO YOU ATTEND CHURCH OR RELIGIOUS SERVICES?: NEVER
WITHIN THE LAST YEAR, HAVE YOU BEEN KICKED, HIT, SLAPPED, OR OTHERWISE PHYSICALLY HURT BY YOUR PARTNER OR EX-PARTNER?: NO
WITHIN THE LAST YEAR, HAVE YOU BEEN AFRAID OF YOUR PARTNER OR EX-PARTNER?: NO
WITHIN THE LAST YEAR, HAVE TO BEEN RAPED OR FORCED TO HAVE ANY KIND OF SEXUAL ACTIVITY BY YOUR PARTNER OR EX-PARTNER?: NO
HOW OFTEN DO YOU ATTENT MEETINGS OF THE CLUB OR ORGANIZATION YOU BELONG TO?: NEVER
IN A TYPICAL WEEK, HOW MANY TIMES DO YOU TALK ON THE PHONE WITH FAMILY, FRIENDS, OR NEIGHBORS?: THREE TIMES A WEEK

## 2021-08-25 ASSESSMENT — LIFESTYLE VARIABLES
HOW OFTEN DO YOU HAVE A DRINK CONTAINING ALCOHOL: 2-3 TIMES A WEEK
HOW OFTEN DO YOU HAVE SIX OR MORE DRINKS ON ONE OCCASION: LESS THAN MONTHLY
HOW MANY STANDARD DRINKS CONTAINING ALCOHOL DO YOU HAVE ON A TYPICAL DAY: 1 OR 2

## 2021-08-25 ASSESSMENT — PAIN SCALES - GENERAL: PAINLEVEL: NO PAIN (0)

## 2021-08-25 ASSESSMENT — MIFFLIN-ST. JEOR: SCORE: 1401.28

## 2021-08-25 NOTE — NURSING NOTE
Chief Complaint   Patient presents with     Physical     pt here for physical, discus worsening heartburn over last few months       Joey Brennan CMA, EMT at 9:42 AM on 8/25/2021.

## 2021-08-30 LAB
BKR LAB AP GYN ADEQUACY: NORMAL
BKR LAB AP GYN INTERPRETATION: NORMAL
BKR LAB AP HPV REFLEX: NORMAL
BKR LAB AP LMP: NORMAL
BKR LAB AP PREVIOUS ABNORMAL: NORMAL
PATH REPORT.COMMENTS IMP SPEC: NORMAL
PATH REPORT.RELEVANT HX SPEC: NORMAL

## 2021-09-01 LAB
HUMAN PAPILLOMA VIRUS 16 DNA: NEGATIVE
HUMAN PAPILLOMA VIRUS 18 DNA: NEGATIVE
HUMAN PAPILLOMA VIRUS FINAL DIAGNOSIS: NORMAL
HUMAN PAPILLOMA VIRUS OTHER HR: NEGATIVE

## 2021-09-01 NOTE — RESULT ENCOUNTER NOTE
Dear Zeinab Angel   Your PAP was normal NIL and HPV negative, good results next PAP in 5 years due 8/2026. We recommend an annual health visit.  Best wishes,  Vinay Ramesh MD

## 2021-10-03 ENCOUNTER — HEALTH MAINTENANCE LETTER (OUTPATIENT)
Age: 31
End: 2021-10-03

## 2022-09-10 ENCOUNTER — HEALTH MAINTENANCE LETTER (OUTPATIENT)
Age: 32
End: 2022-09-10

## 2023-01-21 ENCOUNTER — HEALTH MAINTENANCE LETTER (OUTPATIENT)
Age: 33
End: 2023-01-21

## 2024-02-18 ENCOUNTER — HEALTH MAINTENANCE LETTER (OUTPATIENT)
Age: 34
End: 2024-02-18

## (undated) RX ORDER — LIDOCAINE HYDROCHLORIDE AND EPINEPHRINE 15; 5 MG/ML; UG/ML
INJECTION, SOLUTION EPIDURAL
Status: DISPENSED
Start: 2017-12-04

## (undated) RX ORDER — LIDOCAINE HYDROCHLORIDE AND EPINEPHRINE 10; 10 MG/ML; UG/ML
INJECTION, SOLUTION INFILTRATION; PERINEURAL
Status: DISPENSED
Start: 2020-12-07

## (undated) RX ORDER — LIDOCAINE HYDROCHLORIDE 10 MG/ML
INJECTION, SOLUTION EPIDURAL; INFILTRATION; INTRACAUDAL; PERINEURAL
Status: DISPENSED
Start: 2020-12-07